# Patient Record
Sex: MALE | Race: WHITE | Employment: PART TIME | URBAN - METROPOLITAN AREA
[De-identification: names, ages, dates, MRNs, and addresses within clinical notes are randomized per-mention and may not be internally consistent; named-entity substitution may affect disease eponyms.]

---

## 2017-12-12 ENCOUNTER — HOSPITAL ENCOUNTER (OUTPATIENT)
Dept: CT IMAGING | Age: 72
Discharge: HOME | End: 2017-12-12
Admitting: SPECIALIST

## 2017-12-12 DIAGNOSIS — C67.9 MALIGNANT NEOPLASM OF URINARY BLADDER, UNSPECIFIED SITE (HCC): ICD-10-CM

## 2017-12-12 LAB
GFR AFRICAN AMERICAN: >60 ML/MIN/1.73M2
GFR NON-AFRICAN AMERICAN: >60 ML/MIN/1.73M2
POC CREATININE: 1 MG/DL (ref 0.9–1.3)

## 2020-10-12 ENCOUNTER — APPOINTMENT (OUTPATIENT)
Dept: CT IMAGING | Age: 75
End: 2020-10-12
Payer: MEDICARE

## 2020-10-12 ENCOUNTER — HOSPITAL ENCOUNTER (EMERGENCY)
Age: 75
Discharge: HOME | End: 2020-10-12
Payer: MEDICARE

## 2020-10-12 VITALS
TEMPERATURE: 97.7 F | OXYGEN SATURATION: 99 % | BODY MASS INDEX: 34.36 KG/M2 | SYSTOLIC BLOOD PRESSURE: 177 MMHG | DIASTOLIC BLOOD PRESSURE: 68 MMHG | WEIGHT: 240 LBS | RESPIRATION RATE: 18 BRPM | HEART RATE: 58 BPM | HEIGHT: 70 IN

## 2020-10-12 PROCEDURE — 70450 CT HEAD/BRAIN W/O DYE: CPT

## 2020-10-12 PROCEDURE — 6360000002 HC RX W HCPCS: Performed by: PHYSICIAN ASSISTANT

## 2020-10-12 PROCEDURE — 6370000000 HC RX 637 (ALT 250 FOR IP): Performed by: PHYSICIAN ASSISTANT

## 2020-10-12 PROCEDURE — 4500000028 HC INTERMEDIATE PROCEDURE

## 2020-10-12 PROCEDURE — 99284 EMERGENCY DEPT VISIT MOD MDM: CPT

## 2020-10-12 PROCEDURE — 90471 IMMUNIZATION ADMIN: CPT | Performed by: PHYSICIAN ASSISTANT

## 2020-10-12 PROCEDURE — 90715 TDAP VACCINE 7 YRS/> IM: CPT | Performed by: PHYSICIAN ASSISTANT

## 2020-10-12 PROCEDURE — 72125 CT NECK SPINE W/O DYE: CPT

## 2020-10-12 RX ORDER — TRAMADOL HYDROCHLORIDE 50 MG/1
50 TABLET ORAL ONCE
Status: COMPLETED | OUTPATIENT
Start: 2020-10-12 | End: 2020-10-12

## 2020-10-12 RX ORDER — CEPHALEXIN 500 MG/1
500 CAPSULE ORAL 2 TIMES DAILY
Qty: 14 CAPSULE | Refills: 0 | Status: SHIPPED | OUTPATIENT
Start: 2020-10-12 | End: 2020-10-19

## 2020-10-12 RX ORDER — LIDOCAINE HYDROCHLORIDE AND EPINEPHRINE BITARTRATE 20; .01 MG/ML; MG/ML
20 INJECTION, SOLUTION SUBCUTANEOUS ONCE
Status: COMPLETED | OUTPATIENT
Start: 2020-10-12 | End: 2020-10-12

## 2020-10-12 RX ADMIN — LIDOCAINE HYDROCHLORIDE AND EPINEPHRINE BITARTRATE 20 ML: 20; .01 INJECTION, SOLUTION SUBCUTANEOUS at 14:02

## 2020-10-12 RX ADMIN — TETANUS TOXOID, REDUCED DIPHTHERIA TOXOID AND ACELLULAR PERTUSSIS VACCINE, ADSORBED 0.5 ML: 5; 2.5; 8; 8; 2.5 SUSPENSION INTRAMUSCULAR at 15:31

## 2020-10-12 RX ADMIN — TRAMADOL HYDROCHLORIDE 50 MG: 50 TABLET, FILM COATED ORAL at 14:01

## 2020-10-12 ASSESSMENT — PAIN SCALES - GENERAL
PAINLEVEL_OUTOF10: 6
PAINLEVEL_OUTOF10: 6

## 2020-10-12 ASSESSMENT — PAIN DESCRIPTION - LOCATION: LOCATION: FACE;NECK;HEAD

## 2020-10-12 ASSESSMENT — PAIN DESCRIPTION - PAIN TYPE: TYPE: ACUTE PAIN

## 2020-10-12 NOTE — ED TRIAGE NOTES
Pt to the ED via EMS with c/o head injury and laceration. Pt fell forward while getting off of the toilet hitting his head on a bread crate. Laceration to forehead covered by dressing applied by EMS, bleeding controlled at this time. Pt is alert and oriented x4, PERRL. Pt does not take blood thinners. C/o head and neck pain.

## 2020-10-12 NOTE — ED NOTES
Discharge instructions and follow up reviewed with patient. Voiced understanding.      Julianne Merritt RN  10/12/20 7856

## 2020-10-12 NOTE — ED NOTES
Bed: ED-16  Expected date:   Expected time:   Means of arrival:   Comments:  Damaris Carroll RN  10/12/20 5031

## 2020-10-13 NOTE — ED PROVIDER NOTES
EMERGENCY DEPARTMENT ENCOUNTER      PCP: Jeronimo Castaneda MD    [address] [facility]    Chief Complaint   Patient presents with    Head Injury    Laceration     This patient was not evaluated by the attending physician. I have independently evaluated this patient. HPI    Sarah Patino is a 66 y.o. male who presents to the emergency department today for via EMS after sustaining mechanical fall. Patient states that he fell after using the bathroom, he states that he hit his head on a bread crate causing a large laceration to his forehead. He was unable to get up on his own accord. He was able to get a hold of some friends who came and helped him. Upon seeing the laceration on the top of his head they called EMS. He has a large, complex laceration to the forehead and to the bridge of the nose. He denies being on blood thinning medication. No loss of consciousness. He is unsure of his tetanus status. Has no other significant pain complaints. He is alert oriented, conversational.    REVIEW OF SYSTEMS    General: No Fever  ENT:  No visual changes. No headache. Cardiac: No Chest Pain, No syncope  Respiratory: No cough or difficulty breathing  GI: No vomiting. No Bloody Stool or Diarrhea  : No Dysuria or Hematuria  MSKTL:  See HPI. No neck or back pain.   Skin:  Denies rash  Neurologic:  Denies headache, focal weakness or sensory changes   Endocrine:  Denies polyuria or polydypsia   Lymphatic:  Denies swollen glands   See HPI and nursing notes for additional information       PAST MEDICAL & SURGICAL HISTORY    Past Medical History:   Diagnosis Date    Arthritis     Cancer ([facility] [address]     hx bladder cancer- dx 2009- following with Dr Isaias Jones    Chronic back pain     \"on Tramadol- had back surgery in the past\"    Diabetes mellitus ([facility] [address]     [id number]    History of blood transfusion     \"in 2008\"    History of kidney stones     History of motion sickness     Hyperlipidemia     Hypertension     PONV (postoperative nausea and vomiting)     Sleep apnea     sleep study 2014-\"could not tolerate the cpap machine\"     Past Surgical History:   Procedure Laterality Date    BACK SURGERY      per old chart lumbar back surgery done 2006    COLONOSCOPY  2006    CYSTOSCOPY  1993    kidney stone removal    CYSTOSCOPY  2014    bladder biopsy    CYSTOSCOPY  07/12/2016    EYE SURGERY  2010    [facility] cataract ext    JOINT REPLACEMENT      per old chart total right knee in 2002, total left knee 2008 and revision of left knee in 2009   [place] Right 1989    scope    LITHOTRIPSY  6/2010(per old chart)    SHOULDER SURGERY      per old chart rot cuff repair right shoulder in 1990's. left shoulder done 2003    VARICOSE VEIN SURGERY      per old chart [facility] leg vein stripping done 1980's, had some more taken care of left leg since then 2014- after the last surg- got cellulitis left foot        CURRENT MEDICATIONS    Current Outpatient Rx   Medication Sig Dispense Refill    cephALEXin (KEFLEX) 500 MG capsule Take 1 capsule by mouth 2 times daily for 7 days 14 capsule 0    atorvastatin (LIPITOR) 40 MG tablet Take 40 mg by mouth daily      Diclofenac Potassium 50 MG PACK Take by mouth 2 times daily      gabapentin (NEURONTIN) 100 MG capsule Take 100 mg by mouth nightly Take 1- 3 tabs at night      lisinopril (PRINIVIL;ZESTRIL) 40 MG tablet Take 40 mg by mouth daily      metFORMIN (GLUCOPHAGE) 1000 MG tablet Take 1,000 mg by mouth 2 times daily (with meals)      metoprolol succinate ER (TOPROL-XL) 100 MG XL tablet Take 100 mg by mouth daily      niacin 500 MG CR capsule Take 500 mg by mouth 2 times daily      traMADol (ULTRAM) 50 MG tablet Take 50 mg by mouth every 8 hours as needed for Pain      potassium citrate (UROCIT-K 10) 10 MEQ (1080 MG) SR tablet Take by mouth 2 times daily      Cyanocobalamin (VITAMIN B 12 PO) Take 1,000 mg by mouth daily      Pyridoxine HCl (VITAMIN B-6) 50 MG tablet Take 50 mg by mouth Atraumatic, no trismus. Ears canals and TMs free of blood or clear fluid. Nasal passages and oropharynx free of blood or clear fluid. No circumferential periorbital ecchymosis or mastoid ecchymosis noted. Neck/Lymphatics: supple, no JVD, no swollen nodes. No posterior neck tenderness. Range of motion without obvious pain or deficit. Respiratory:  Lungs Clear, no retractions   Cardiovascular:   normal rate, no murmurs  GI:  Soft, nontender, normal bowel sounds  Musculoskeletal:  No edema, no obvious defect or deformities of the bilateral upper and/or lower extremity. No palpable bony tenderness. Integument: Large, complex jagged laceration to the top of the scalp measuring approximately 8 to-10 cm in total diameter. There is also a small laceration to the bridge of the nose. Neurlogic:    - Alert & oriented person, place, time, and situation, no speech difficulties or slurring.  - No obvious gross motor deficits  - Cranial nerves 2-12 grossly intact  - Negative meningeal signs.  - Sensation intact to light touch  - No pronator drift. - Light touch sensation intact throughout. - Upper and lower extremity DTRs 2+ bilaterally. - Gait steady and without difficulty  Psych: Pleasant affect, no hallucinations    LABS:  No results found for this visit on 10/12/20. RADIOLOGY   Ct Head Wo Contrast    Result Date: 10/12/2020  EXAMINATION: CT OF THE HEAD WITHOUT CONTRAST  10/12/2020 12:58 pm TECHNIQUE: CT of the head was performed without the administration of intravenous contrast. Dose modulation, iterative reconstruction, and/or weight based adjustment of the mA/kV was utilized to reduce the radiation dose to as low as reasonably achievable.; CT of the cervical spine was performed without the administration of intravenous contrast. Multiplanar reformatted images are provided for review.  Dose modulation, iterative reconstruction, and/or weight based adjustment of the mA/kV was utilized to reduce the radiation dose to as low as reasonably achievable. COMPARISON: None. HISTORY: ORDERING SYSTEM PROVIDED HISTORY: head injury TECHNOLOGIST PROVIDED HISTORY: Has a \"code stroke\" or \"stroke alert\" been called? ->No Reason for exam:->head injury Reason for Exam: head injury/laceration Acuity: Acute Type of Exam: Initial Mechanism of Injury: fell forward and hit head today, no LOC, was getting up from toilet FINDINGS: Head: BRAIN/VENTRICLES: There is no acute intracranial hemorrhage, mass effect or midline shift. No abnormal extra-axial fluid collection. The gray-white differentiation is maintained without evidence of an acute infarct. Hypoattenuation of the periventricular and subcortical white matter is suggestive of chronic small vessel ischemic disease. Mild diffuse parenchymal volume loss is noted. There is no evidence of hydrocephalus. ORBITS: The visualized portion of the orbits demonstrate no acute abnormality. SINUSES: Marked mucoperiosteal thickening of the bilateral ethmoid air cells is seen. There is minimal mucoperiosteal thickening of the bilateral sphenoid and maxillary sinuses. SOFT TISSUES/SKULL:  No acute fracture or dislocation in the cranial and visualized facial bones. Frontal scalp lacerations noted. Cervical spine: BONES/ALIGNMENT: There is no acute fracture or traumatic malalignment. DEGENERATIVE CHANGES: Moderate multilevel cervical spondylosis is seen, most prominent at C5-C6. SOFT TISSUES: There is no prevertebral soft tissue swelling. Vascular calcification noted. No apical pneumothorax. No acute intracranial abnormality. No acute fracture or subluxation in the cervical spine.      Ct Cervical Spine Wo Contrast    Result Date: 10/12/2020  EXAMINATION: CT OF THE HEAD WITHOUT CONTRAST  10/12/2020 12:58 pm TECHNIQUE: CT of the head was performed without the administration of intravenous contrast. Dose modulation, iterative reconstruction, and/or weight based adjustment of the mA/kV was utilized to reduce the radiation dose to as low as reasonably achievable.; CT of the cervical spine was performed without the administration of intravenous contrast. Multiplanar reformatted images are provided for review. Dose modulation, iterative reconstruction, and/or weight based adjustment of the mA/kV was utilized to reduce the radiation dose to as low as reasonably achievable. COMPARISON: None. HISTORY: ORDERING SYSTEM PROVIDED HISTORY: head injury TECHNOLOGIST PROVIDED HISTORY: Has a \"code stroke\" or \"stroke alert\" been called? ->No Reason for exam:->head injury Reason for Exam: head injury/laceration Acuity: Acute Type of Exam: Initial Mechanism of Injury: fell forward and hit head today, no LOC, was getting up from toilet FINDINGS: Head: BRAIN/VENTRICLES: There is no acute intracranial hemorrhage, mass effect or midline shift. No abnormal extra-axial fluid collection. The gray-white differentiation is maintained without evidence of an acute infarct. Hypoattenuation of the periventricular and subcortical white matter is suggestive of chronic small vessel ischemic disease. Mild diffuse parenchymal volume loss is noted. There is no evidence of hydrocephalus. ORBITS: The visualized portion of the orbits demonstrate no acute abnormality. SINUSES: Marked mucoperiosteal thickening of the bilateral ethmoid air cells is seen. There is minimal mucoperiosteal thickening of the bilateral sphenoid and maxillary sinuses. SOFT TISSUES/SKULL:  No acute fracture or dislocation in the cranial and visualized facial bones. Frontal scalp lacerations noted. Cervical spine: BONES/ALIGNMENT: There is no acute fracture or traumatic malalignment. DEGENERATIVE CHANGES: Moderate multilevel cervical spondylosis is seen, most prominent at C5-C6. SOFT TISSUES: There is no prevertebral soft tissue swelling. Vascular calcification noted. No apical pneumothorax. No acute intracranial abnormality.  No acute fracture or subluxation in the cervical spine.     ________________________________________________________________________       Procedure Note - BINH MARIA PA-C      Laceration Repair Procedure Note    Indication: Skin Laceration-large complex laceration measuring approximately-10 cm to the frontal scalp area    Procedure:   - Procedure explained, including risks and benefits explained to the patient who expressed understanding. All questions were answered. Verbal consent obtained. - The Wound was prepped and draped in the usual sterile fashion using Betadine and sterile saline.  - The wound is anesthetized using 2% Lidocaine w epi, approximately 10 ml  - Wound was explored to it's depth,  no compromise of neurovascular structures, no foreign bodies. - Wound was irrigated with copious amounts of sterile saline and mechanically debrided utilizing sterile gauze. - The laceration was Closed with 4-0 Vicryl and 4-0 Prolene sutures, total number of 14, 12 simple interrupted, 2 subcuticular stitches. - Hemostasis and good cosmesis was achieved. Blood loss minimal.  - The wound area was then dressed with Sterile nonstick dressing, sterile gauze, and tape. - Patient tolerated procedure well without complications. Total repaired wound length: 10 cm    Discussed with Pt (and/or family member) at bedside today:  I discussed possibility of infection, retained foreign body, tendon injury, nerve injury. Wound care and scar minimization education was provided. Instructions were given to return for increasing pain, redness, streaking, discharge, or any other worsening or worrisome concerns. Wound check in 48 hours. Suture/Staple removal in 7-10 days.     ________________________________________________________________________      ________________________________________________________________________       Procedure Note - BINH MARIA PA-C      Laceration Repair Procedure Note    Indication: Skin Laceration-1 cm nasal bridge laceration    Procedure:   - Procedure explained, including risks and benefits explained to the patient who expressed understanding. All questions were answered. Verbal consent obtained. - The Wound was prepped and draped in the usual sterile fashion using Betadine and sterile saline.  - The wound is anesthetized using 2% Lidocaine, approximately 2 ml  - Wound was explored to it's depth,  no compromise of neurovascular structures, no foreign bodies. - Wound was irrigated with copious amounts of sterile saline and mechanically debrided utilizing sterile gauze. - The laceration was Closed with 4-0 vicryl sutures, total number of 3,  simple interrupted  - Patient tolerated procedure well without complications. Total repaired wound length: 1 cm    Discussed with Pt (and/or family member) at bedside today:  I discussed possibility of infection, retained foreign body, tendon injury, nerve injury. Wound care and scar minimization education was provided. Instructions were given to return for increasing pain, redness, streaking, discharge, or any other worsening or worrisome concerns. Wound check in 48 hours. Suture/Staple removal in 7 days. ________________________________________________________________________    ED COURSE & MEDICAL DECISION MAKING        Patient presents as above. Emergent etiologies considered. Sustained a mechanical fall landing on a bread crate. Large without laceration to the forehead, bridge of the nose. CT imaging of the head and neck acutely negative. Patient is otherwise neurologically intact, no other pain complaints. Complex lacerations were repaired by myself. Will be placed on Keflex for empiric/infection prophylaxis. We will encourage a wound check in 2 days, suture removal in 7 to 10 days. Patient compliant with this plan. He was ambulatory after this fall. He was advised on close monitoring of symptoms and strict return precautions.   We discharged home in stable condition. All pertinent Lab data and radiographic results reviewed with patient at bedside. The patient and/or the family were informed of the results of any tests/labs/imaging, the treatment plan, and time was allotted to answer questions. Clinical  IMPRESSION    1. Fall, initial encounter    2. Closed head injury, initial encounter    3. Laceration of scalp, initial encounter    4. Nasal laceration, initial encounter      Comment: Please note this report has been produced using speech recognition software and may contain errors related to that system including errors in grammar, punctuation, and spelling, as well as words and phrases that may be inappropriate. If there are any questions or concerns please feel free to contact the dictating provider for clarification.       Ramone Arzate [address], PA  10/12/20 2637

## 2021-01-07 ENCOUNTER — HOSPITAL ENCOUNTER (OUTPATIENT)
Dept: PHYSICAL THERAPY | Age: 76
Setting detail: THERAPIES SERIES
Discharge: HOME | End: 2021-01-07
Payer: MEDICARE

## 2021-01-07 PROCEDURE — 97110 THERAPEUTIC EXERCISES: CPT

## 2021-01-07 PROCEDURE — 97162 PT EVAL MOD COMPLEX 30 MIN: CPT

## 2021-01-07 NOTE — PLAN OF CARE
Outpatient Physical Therapy           [place]           [x] Phone: [phone]   Fax: [phone]  [place]           [] Phone: [phone]   Fax: [phone]     To: Referring Practitioner: REJI Arenas  From: Celeste Bower, PT, DPT     Patient: Benedict Argueta       : 1945  Diagnosis: Diagnosis: Multiple falls. OA involving multiple sites but not designated as generalized   Treatment Diagnosis: Treatment Diagnosis: Impaired balance, BLE weakness   Date: 2021    Physical Therapy Certification/Re-Certification Form  Dear Eloise Tomlinson,  The following patient has been evaluated for physical therapy services and for therapy to continue, insurance requires physician review of the treatment plan initially and every 90 days. Please review the attached evaluation and/or summary of the patient's plan of care, and verify that you agree therapy should continue by signing the attached document and sending it back to our office. Assessment:    Assessment: Pt is a 75-year-old male who has worsening balance for years. It has progressed to the point of 3 major falls within the last 3 months. Pt presents with impairments in balance, impaired gait, impaired BLE strength and overall reduced independence with safe ADL/IADL completion. Pt would benefit from skilled therapy interventions to address listed impairments, progress toward goal completion and improve ADL/IADL status. PT also warranted to reduce risk for future falls/injury or further decline. Patient agrees with established plan of care and assisted in the development of their short term and long term goals. Patient had no adverse reaction with initial treatment and there are no barriers to learning. Demonstrates no mental or cognitive disorder.      Plan of Care/Treatment to date:  [x] Therapeutic Exercise  [x] Modalities:  [x] Therapeutic Activity     [] Ultrasound  [x] Electrical Stimulation [x] Gait Training      [] Cervical Traction [] Lumbar Traction  [x] Neuromuscular Re-education    [x] Cold/hotpack [] Iontophoresis   [x] Instruction in HEP      [x] Vasopneumatic    [] Dry Needling  [x] Manual Therapy               [] Aquatic Therapy       Other:          Frequency/Duration:  # Days per week: [] 1 day # Weeks: [] 1 week [x] 5 weeks     [x] 2 days   [] 2 weeks [] 6 weeks     [] 3 days   [] 3 weeks [] 7 weeks     [] 4 days   [] 4 weeks [] 8 weeks         [] 9 weeks [] 10 weeks         [] 11 weeks [] 12 weeks    Rehab Potential/Progress: [] Excellent [x] Good [] Fair  [] Poor     Goals:      Short term goals  Time Frame for Short term goals: 5 visits  Short term goal 1: Pt will be Ind with HEP in order to maximize recovery outside of clinic  Long term goals  Time Frame for Long term goals : 10 visits  Long term goal 1: Pt will improve BLE strength to gross 4+/5 to aide in gait  Long term goal 2: Pt will reduce TUG score to 12.5 seconds or less to show reduced fall risk  Long term goal 3: Pt will improve Jacobson score to 50 or higher to reduce fall risk  Long term goal 4: Pt will improve 30 second STS score to 7 or more to show improved balance      Electronically signed by:  Nazanin Gonzalez PT, DPT 1/7/2021, 2:40 PM        If you have any questions or concerns, please don't hesitate to call.   Thank you for your referral.      Physician Signature:________________________________Date:_________ TIME: _____  By signing above, therapists plan is approved by physician

## 2021-01-07 NOTE — FLOWSHEET NOTE
Summary of Evaluation: Assessment: Pt is a 78-year-old male who has worsening balance for years. It has progressed to the point of 3 major falls within the last 3 months. Pt presents with impairments in balance, impaired gait, impaired BLE strength and overall reduced independence with safe ADL/IADL completion. Pt would benefit from skilled therapy interventions to address listed impairments, progress toward goal completion and improve ADL/IADL status. PT also warranted to reduce risk for future falls/injury or further decline. Subjective:  See eval         Any changes in Ambulatory Summary Sheet? None        Objective:  See eval   Prior to today's treatment session, patient was screened for signs and symptoms related to COVID-19 including but not limited to verbally answering questions related to feeling ill, cough, or SOB, along with taking temperature via forehead thermometer. Patient presented with all negative signs and symptoms and had no fever >100 degrees Fahrenheit this date. Exercises: (No more than 4 columns)   Exercise/Equipment 1/7/21 #1 Date Date           WARM UP                     TABLE      Bridges x10     Clams X10, 3\" ea BLE     Standing marches  2x10 ea BLE                    STANDING      STS x10 w/ 3\" glute set standing                                              PROPRIOCEPTION                                    MODALITIES                      Other Therapeutic Activities/Education:  HEP and importance of completion, POC and goals, anatomy and physiology related to condition    Home Exercise Program:    1/7: STS, standing marches, jaspreet, bridges    Manual Treatments:  none      Modalities:  none      Communication with other providers:  POC sent      Assessment: 0/10 end pain. Pt tolerated today's treatment without any adverse reactions or complications this date. Assessment: Pt is a 68-year-old male who has worsening balance for years. It has progressed to the point of 3 major falls within the last 3 months. Pt presents with impairments in balance, impaired gait, impaired BLE strength and overall reduced independence with safe ADL/IADL completion. Pt would benefit from skilled therapy interventions to address listed impairments, progress toward goal completion and improve ADL/IADL status. PT also warranted to reduce risk for future falls/injury or further decline.       Plan for Next Session: Specific instructions for Next Treatment: nustep, BLE strength, focus on balance and gait      Time In / Time Out:   [id number]      Timed Code/Total Treatment Minutes:  60': 14' TE x1, 46' Eval x1    Next Progress Note due:  10th visit or 30 days    Plan of Care Interventions:  [x] Therapeutic Exercise  [x] Modalities:  [x] Therapeutic Activity     [] Ultrasound  [x] Estim  [x] Gait Training      [] Cervical Traction [] Lumbar Traction  [x] Neuromuscular Re-education    [x] Cold/hotpack [] Iontophoresis   [x] Instruction in HEP      [x] Vasopneumatic   [] Dry Needling    [x] Manual Therapy               [] Aquatic Therapy              Electronically signed by:  Pedro Santana PT, DPT 1/7/2021, 2:41 PM

## 2021-01-07 NOTE — PROGRESS NOTES
Physical Therapy  Initial Assessment  Date: 2021  Patient Name: Brant Keller  MRN: [id number]  : 1945     Treatment Diagnosis: Impaired balance, BLE weakness    Restrictions: none     Subjective   General  Chart Reviewed: Yes  Patient assessed for rehabilitation services?: Yes  Additional Pertinent Hx: RA, HTN, DM II, CA  Referring Practitioner: REJI Meng  Referral Date : 12/15/20  Diagnosis: Multiple falls. OA involving multiple sites but not designated as generalized  Follows Commands: Within Functional Limits  PT Visit Information  PT Insurance Information: Medicare  Subjective  Subjective: Pt has had 2 major falls within the last few months. 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 He also fell in December at his house and had difficulty getting up after he had tripped over cardboard. Pt notes that he has had both knees replaced, 4 L shoulder and 2 R shoulder surgeries, has rods in his back and has neck pain which is also sometimes impairing to his mobility. He has difficulty with stairs, walking, and mobility while carrying an object.   Pain Screening  Patient Currently in Pain: No  Vital Signs  Patient Currently in Pain: No    Vision/Hearing: WFL     Orientation  Orientation  Overall Orientation Status: Within Normal Limits    Social/Functional History  Social/Functional History  Lives With: Alone  Type of Home: House  ADL Assistance: Independent  Homemaking Assistance: Independent  Ambulation Assistance: Independent  Transfer Assistance: Independent  Active : Yes  Mode of Transportation: Car  Occupation: Part time employment  Type of occupation: [employer]  Leisure & Hobbies: Pt volunteers 2x/ weed at the [employer] kitchen    Objective     Observation/Palpation  Posture: Fair  Observation: Gait: WBOS with reduced keven and short step length BL    AROM RLE (degrees) RLE AROM: WNL  AROM LLE (degrees)  LLE AROM : WNL    Strength RLE  Strength RLE: Exception  R Hip Flexion: 4/5  R Hip ABduction: 4/5  R Hip ADduction: 4+/5  R Knee Flexion: 4/5  R Knee Extension: 4+/5  Strength LLE  Strength LLE: Exception  L Hip Flexion: 4/5  L Hip ABduction: 4/5  L Hip ADduction: 4+/5  L Knee Flexion: 4/5  L Knee Extension: 4+/5     Additional Measures  Special Tests: Jacobson/56. TU.31 seconds. 30 sec STS: 5 1/2 w/ use of UEs. Other: WBOS foam EC balance - min sway       Assessment   Conditions Requiring Skilled Therapeutic Intervention  Body structures, Functions, Activity limitations: Decreased functional mobility ; Decreased ADL status; Decreased balance;Decreased strength;Decreased high-level IADLs  Assessment: Pt is a 49-year-old male who has worsening balance for years. It has progressed to the point of 3 major falls within the last 3 months. Pt presents with impairments in balance, impaired gait, impaired BLE strength and overall reduced independence with safe ADL/IADL completion. Pt would benefit from skilled therapy interventions to address listed impairments, progress toward goal completion and improve ADL/IADL status. PT also warranted to reduce risk for future falls/injury or further decline. Treatment Diagnosis: Impaired balance, BLE weakness  Prognosis: Good  Decision Making: Medium Complexity  History: see above. PLOF: independent  Exam: see above  Clinical Presentation: see above  Barriers to Learning: none. style: demonsration  REQUIRES PT FOLLOW UP: Yes  Treatment Initiated : yes on     Patient agrees with established plan of care and assisted in the development of their short term and long term goals. Patient had no adverse reaction with initial treatment and there are no barriers to learning. Demonstrates no mental or cognitive disorder.          Plan   Plan  Times per week: 2  Times per day: Daily  Plan weeks: 5 Specific instructions for Next Treatment: nustep, BLE strength, focus on balance and gait  Current Treatment Recommendations: Strengthening, IADL Training, Neuromuscular Re-education, Home Exercise Program, ROM, Manual Therapy - Soft Tissue Mobilization, Safety Education & Training, Balance Training, Patient/Caregiver Education & Training, Functional Mobility Training, Modalities, Gait Training, ADL/Self-care Training, Pain Management    OutComes Score: see above  Goals  Short term goals  Time Frame for Short term goals: 5 visits  Short term goal 1: Pt will be Ind with HEP in order to maximize recovery outside of clinic  Long term goals  Time Frame for Long term goals : 10 visits  Long term goal 1: Pt will improve BLE strength to gross 4+/5 to aide in gait  Long term goal 2: Pt will reduce TUG score to 12.5 seconds or less to show reduced fall risk  Long term goal 3: Pt will improve Jacobson score to 50 or higher to reduce fall risk  Long term goal 4: Pt will improve 30 second STS score to 7 or more to show improved balance  Patient Goals   Patient goals : reduce falls and feel more steady       Therapy Time: 9852-8942  Paula Romberg, PT, DPT

## 2021-01-12 ENCOUNTER — HOSPITAL ENCOUNTER (OUTPATIENT)
Dept: PHYSICAL THERAPY | Age: 76
Discharge: HOME | End: 2021-01-12

## 2021-01-12 NOTE — FLOWSHEET NOTE
Patients Plan of Care was received and signed. Signed POC was scanned and placed in the patients chart.     Luisito Flores

## 2021-01-12 NOTE — FLOWSHEET NOTE
Physical Therapy  Cancellation/No-show Note  Patient Name:  Ale Post  :  1945   Date:  2021  Cancelled visits to date: 0  No-shows to date: 0    For today's appointment patient:  [x]  Cancelled  []  Rescheduled appointment  []  No-show     Reason given by patient:  [x]  Patient ill  []  Conflicting appointment  []  No transportation    []  Conflict with work  []  No reason given  [x]  Other:     Comments:   Feeling woozy and is going back to bed.     Electronically signed by:  Jose F Vang PTA, CLT 2021, 8:55 AM

## 2021-01-18 ENCOUNTER — HOSPITAL ENCOUNTER (OUTPATIENT)
Dept: PHYSICAL THERAPY | Age: 76
Setting detail: THERAPIES SERIES
Discharge: HOME | End: 2021-01-18
Payer: MEDICARE

## 2021-01-18 PROCEDURE — 97112 NEUROMUSCULAR REEDUCATION: CPT

## 2021-01-18 PROCEDURE — 97110 THERAPEUTIC EXERCISES: CPT

## 2021-01-18 NOTE — FLOWSHEET NOTE
Outpatient Physical Therapy  [place]           [x] Phone: [phone]   Fax: [phone]  [facility]           [] Phone: [phone]   Fax: [phone]        Physical Therapy Daily Treatment Note  Date:  2021    Patient Name:  Ernst Hill :  1945  MRN: [id number]  Restrictions/Precautions:   Prior bladder CA - caution with modalities   Diagnosis:   Diagnosis: Multiple falls. OA involving multiple sites but not designated as generalized  Date of Injury/Surgery:   Treatment Diagnosis: Treatment Diagnosis: Impaired balance, BLE weakness    Insurance/Certification information: PT Insurance Information: Medicare   Referring Physician:  Referring Practitioner: REJI Phelan  Next Doctor Visit:    Plan of care signed (Y/N):  yes  Outcome Measure: Zofia Willams: 41/56. TU.31 seconds. 30 sec       STS: 5 1/2 w/ use of UEs.   Visit# / total visits: 2/10  Pain level: 0/10   Goals:        Short term goals  Time Frame for Short term goals: 5 visits  Short term goal 1: Pt will be Ind with HEP in order to maximize recovery outside of clinic: Good progression   Long term goals  Time Frame for Long term goals : 10 visits  Long term goal 1: Pt will improve BLE strength to gross 4+/5 to aide in gait  Long term goal 2: Pt will reduce TUG score to 12.5 seconds or less to show reduced fall risk  Long term goal 3: Pt will improve Jacobson score to 50 or higher to reduce fall risk  Long term goal 4: Pt will improve 30 second STS score to 7 or more to show improved balance Summary of Evaluation: Assessment: Pt is a 78-year-old male who has worsening balance for years. It has progressed to the point of 3 major falls within the last 3 months. Pt presents with impairments in balance, impaired gait, impaired BLE strength and overall reduced independence with safe ADL/IADL completion. Pt would benefit from skilled therapy interventions to address listed impairments, progress toward goal completion and improve ADL/IADL status. PT also warranted to reduce risk for future falls/injury or further decline. Subjective:  Pt is doing fine this date but is having a harder time getting around due to his biopsy Thursday. Per pt report, he is allowed to be at therapy after the biopsy. He did note that he was sore over the weekend after trying to replace his air vent in his bathroom. He has done his HEP and all are going well except for the bridge due to a soft bed. Any changes in Ambulatory Summary Sheet? None        Objective:    Prior to today's treatment session, patient was screened for signs and symptoms related to COVID-19 including but not limited to verbally answering questions related to feeling ill, cough, or SOB, along with taking temperature via forehead thermometer.  Patient presented with all negative signs and symptoms and had no fever >100 degrees Fahrenheit this date.     -Actual leg length: 36 1/2\" BL from ASIS to medial malleoli     Exercises: (No more than 4 columns)   Exercise/Equipment 1/7/21 #1 1/18/21 #2 Date           WARM UP         Nustep  x7'  L1          TABLE      Bridges x10 x12    Clams X10, 3\" ea BLE 2x10 ea BLE , 3\"    Standing marches  2x10 ea BLE     SLR  x10 ea BLE    SAQ  X10, 3\" BLE ea                   STANDING      STS x10 w/ 3\" glute set standing x10 w/ 3\" glute set standing                                             PROPRIOCEPTION      Static balance  Foam: EO/EC romberg, EO/EC 1/2 tandem, EO SLS    Foam beam  sidestepping MODALITIES                      Other Therapeutic Activities/Education:  HEP and importance of completion. Home Exercise Program:    1/7: STS, standing marches, clams, bridges  1/18: replaced bridge with SLR    Manual Treatments:  none      Modalities:  none      Communication with other providers:  POC sent      Assessment: 0/10 end pain. Pt tolerated today's treatment without any adverse reactions or complications this date. Pt did require multiple breaks between and after exercises due to fatigue and weakness this date. Pt would continue to benefit from skilled therapy interventions to address remaining impairments, improve mobility and strength and progress toward goal completion while reducing risk for re-injury or further decline.     Plan for Next Session:  nustep, BLE strength, focus on balance and gait    Time In / Time Out:  5937 - 2232    Timed Code/Total Treatment Minutes:  37': 12' NMR x1, 31' TE x2    Next Progress Note due:  10th visit or 30 days    Plan of Care Interventions:  [x] Therapeutic Exercise  [x] Modalities:  [x] Therapeutic Activity     [] Ultrasound  [x] Estim  [x] Gait Training      [] Cervical Traction [] Lumbar Traction  [x] Neuromuscular Re-education    [x] Cold/hotpack [] Iontophoresis   [x] Instruction in HEP      [x] Vasopneumatic   [] Dry Needling    [x] Manual Therapy               [] Aquatic Therapy              Electronically signed by:  Stacie Keene PT, DPT 1/18/2021, 9:00 AM

## 2021-01-21 ENCOUNTER — HOSPITAL ENCOUNTER (OUTPATIENT)
Dept: PHYSICAL THERAPY | Age: 76
Setting detail: THERAPIES SERIES
Discharge: HOME | End: 2021-01-21
Payer: MEDICARE

## 2021-01-21 PROCEDURE — 97112 NEUROMUSCULAR REEDUCATION: CPT

## 2021-01-21 PROCEDURE — 97110 THERAPEUTIC EXERCISES: CPT

## 2021-01-21 NOTE — FLOWSHEET NOTE
Outpatient Physical Therapy  [place]           [x] Phone: [phone]   Fax: [phone]  [place]           [] Phone: [phone]   Fax: [phone]        Physical Therapy Daily Treatment Note  Date:  2021    Patient Name:  Kang Romero :  1945  MRN: [id number]  Restrictions/Precautions:   Prior bladder CA - caution with modalities   Diagnosis:   Diagnosis: Multiple falls. OA involving multiple sites but not designated as generalized  Date of Injury/Surgery:   Treatment Diagnosis: Treatment Diagnosis: Impaired balance, BLE weakness    Insurance/Certification information: PT Insurance Information: Medicare   Referring Physician:  Referring Practitioner: REJI Kee  Next Doctor Visit:    Plan of care signed (Y/N):  yes  Outcome Measure: Sherry Ink: 41/56. TU.31 seconds. 30 sec       STS: 5 1/2 w/ use of UEs.   Visit# / total visits: 3/10  Pain level: 2/10   Goals:        Short term goals  Time Frame for Short term goals: 5 visits  Short term goal 1: Pt will be Ind with HEP in order to maximize recovery outside of clinic: Good progression   Long term goals  Time Frame for Long term goals : 10 visits  Long term goal 1: Pt will improve BLE strength to gross 4+/5 to aide in gait  Long term goal 2: Pt will reduce TUG score to 12.5 seconds or less to show reduced fall risk  Long term goal 3: Pt will improve Jacobson score to 50 or higher to reduce fall risk  Long term goal 4: Pt will improve 30 second STS score to 7 or more to show improved balance Summary of Evaluation: Assessment: Pt is a 64-year-old male who has worsening balance for years. It has progressed to the point of 3 major falls within the last 3 months. Pt presents with impairments in balance, impaired gait, impaired BLE strength and overall reduced independence with safe ADL/IADL completion. Pt would benefit from skilled therapy interventions to address listed impairments, progress toward goal completion and improve ADL/IADL status. PT also warranted to reduce risk for future falls/injury or further decline. Subjective: Pt states he is doing alright this date. He is having some soreness from his exercises last night but nothing significant. Any changes in Ambulatory Summary Sheet? None      Objective:    Prior to today's treatment session, patient was screened for signs and symptoms related to COVID-19 including but not limited to verbally answering questions related to feeling ill, cough, or SOB, along with taking temperature via forehead thermometer.  Patient presented with all negative signs and symptoms and had no fever >100 degrees Fahrenheit this date.     -Actual leg length: 36 1/2\" BL from ASIS to medial malleoli     Exercises: (No more than 4 columns)   Exercise/Equipment 1/7/21 #1 1/18/21 #2 1/21/21 #3           WARM UP         Nustep  x7'  L1 x6' L3         TABLE      Bridges x10 x12    Clams X10, 3\" ea BLE 2x10 ea BLE , 3\"    Standing marches  2x10 ea BLE  2x10 ea BLE   SLR  x10 ea BLE    SAQ  X10, 3\" BLE ea    LAQ   x10 ea BLE ea            STANDING      STS x10 w/ 3\" glute set standing x10 w/ 3\" glute set standing x15   Standing hip abd   x10 ea BLE                                      PROPRIOCEPTION      Static balance  Foam: EO/EC romberg, EO/EC 1/2 tandem, EO SLS Foam: EO/EC romberg, EO/EC 1/2 tandem, EO SLS   Foam beam  sidestepping    Dynamic standing    Tandem   Backwards   Sidestepping                MODALITIES Other Therapeutic Activities/Education:  HEP and importance of completion. Home Exercise Program:    1/7: STS, standing marches, clams, bridges  1/18: replaced bridge with SLR    Manual Treatments:  none      Modalities:  none      Communication with other providers:  POC sent      Assessment: 0/10 end pain. Pt tolerated today's treatment without any adverse reactions or complications this date. Pt demonstrated improved EC foam balance this date compared to last. Pt would continue to benefit from skilled therapy interventions to address remaining impairments, improve mobility and strength and progress toward goal completion while reducing risk for re-injury or further decline.     Plan for Next Session:  contreras, PHILIPPE strength, focus on balance and gait    Time In / Time Out:  5993-7801    Timed Code/Total Treatment Minutes:  34': 12' NMR x1, 22' TE x1    Next Progress Note due:  10th visit or 30 days    Plan of Care Interventions:  [x] Therapeutic Exercise  [x] Modalities:  [x] Therapeutic Activity     [] Ultrasound  [x] Estim  [x] Gait Training      [] Cervical Traction [] Lumbar Traction  [x] Neuromuscular Re-education    [x] Cold/hotpack [] Iontophoresis   [x] Instruction in HEP      [x] Vasopneumatic   [] Dry Needling    [x] Manual Therapy               [] Aquatic Therapy              Electronically signed by:  Stacie Keene PT, DPT 1/21/2021, 8:57 AM

## 2021-01-26 ENCOUNTER — HOSPITAL ENCOUNTER (OUTPATIENT)
Dept: PHYSICAL THERAPY | Age: 76
Setting detail: THERAPIES SERIES
Discharge: HOME | End: 2021-01-26
Payer: MEDICARE

## 2021-01-26 PROCEDURE — 97110 THERAPEUTIC EXERCISES: CPT

## 2021-01-26 PROCEDURE — 97112 NEUROMUSCULAR REEDUCATION: CPT

## 2021-01-26 NOTE — FLOWSHEET NOTE
Outpatient Physical Therapy  [place]           [x] Phone: [phone]   Fax: [phone]  [place]           [] Phone: [phone]   Fax: [phone]        Physical Therapy Daily Treatment Note  Date:  2021    Patient Name:  Lisa Floyd :  1945  MRN: [id number]  Restrictions/Precautions:   Prior bladder CA - caution with modalities   Diagnosis:   Diagnosis: Multiple falls. OA involving multiple sites but not designated as generalized  Date of Injury/Surgery:   Treatment Diagnosis: Treatment Diagnosis: Impaired balance, BLE weakness    Insurance/Certification information: PT Insurance Information: Medicare   Referring Physician:  Referring Practitioner: REJI Wong  Next Doctor Visit:    Plan of care signed (Y/N):  yes  Outcome Measure: Alex Cobos: 41/56. TU.31 seconds. 30 sec       STS: 5 1/2 w/ use of UEs.   Visit# / total visits: 4/10  Pain level: 3/10   Goals:        Short term goals  Time Frame for Short term goals: 5 visits  Short term goal 1: Pt will be Ind with HEP in order to maximize recovery outside of clinic: Good progression   Long term goals  Time Frame for Long term goals : 10 visits  Long term goal 1: Pt will improve BLE strength to gross 4+/5 to aide in gait  Long term goal 2: Pt will reduce TUG score to 12.5 seconds or less to show reduced fall risk  Long term goal 3: Pt will improve Jacobson score to 50 or higher to reduce fall risk  Long term goal 4: Pt will improve 30 second STS score to 7 or more to show improved balance Summary of Evaluation: Assessment: Pt is a 47-year-old male who has worsening balance for years. It has progressed to the point of 3 major falls within the last 3 months. Pt presents with impairments in balance, impaired gait, impaired BLE strength and overall reduced independence with safe ADL/IADL completion. Pt would benefit from skilled therapy interventions to address listed impairments, progress toward goal completion and improve ADL/IADL status. PT also warranted to reduce risk for future falls/injury or further decline. Subjective: Pt is doing alright this date. He had to take a muscle relaxer yesterday after a 16 hour work day and is having some discomfort today due to it. Any changes in Ambulatory Summary Sheet? None      Objective:    Prior to today's treatment session, patient was screened for signs and symptoms related to COVID-19 including but not limited to verbally answering questions related to feeling ill, cough, or SOB, along with taking temperature via forehead thermometer.  Patient presented with all negative signs and symptoms and had no fever >100 degrees Fahrenheit this date.     -Actual leg length: 36 1/2\" BL from ASIS to medial malleoli     Exercises: (No more than 4 columns)   Exercise/Equipment 1/18/21 #2 1/21/21 #3 1/26/21 #4           WARM UP         Nustep x7'  L1 x6' L3 L3 x8'         TABLE      Bridges x12  2x10   Clams 2x10 ea BLE , 3\"  x10 ea BLE YTB   Seated marches   x10 ea BLE ea   SLR x10 ea BLE  x10 ea BLE   SAQ X10, 3\" BLE ea     LAQ  x10 ea BLE ea x10 ea BLE ea            STANDING      STS x10 w/ 3\" glute set standing x15 x20   Standing hip abd  x10 ea BLE    Standing marches  2x10 ea BLE                                 PROPRIOCEPTION      Static balance Foam: EO/EC romberg, EO/EC 1/2 tandem, EO SLS Foam: EO/EC romberg, EO/EC 1/2 tandem, EO SLS Floor: EO/EC romberg, EO/EC 1/2 tandem   Step up and over   4\" x4 w/ cues for proper steeping   Foam beam sidestepping Dynamic standing   Tandem   Backwards   Sidestepping     Cone tap   x10 ea side BL         MODALITIES                      Other Therapeutic Activities/Education:  HEP and importance of completion. Home Exercise Program:    1/7: STS, standing marches, jaspreet, bridges  1/18: replaced bridge with SLR    Manual Treatments:  none      Modalities:  none      Communication with other providers:  POC sent      Assessment: 0/10 end pain. Pt tolerated today's treatment without any adverse reactions or complications this date. Pt required increased rest and lower level exercise this date due to overworking at shift yesterday at work. Pt did tolerate all activity well after more rest given. Pt would continue to benefit from skilled therapy interventions to address remaining impairments, improve mobility and strength and progress toward goal completion while reducing risk for re-injury or further decline.     Plan for Next Session:  PHILIPPE chairez strength, focus on balance and gait    Time In / Time Out:  4428-3679    Timed Code/Total Treatment Minutes:  41': 13' NMR x1, 28' TE x2    Next Progress Note due:  10th visit or 30 days    Plan of Care Interventions:  [x] Therapeutic Exercise  [x] Modalities:  [x] Therapeutic Activity     [] Ultrasound  [x] Estim  [x] Gait Training      [] Cervical Traction [] Lumbar Traction  [x] Neuromuscular Re-education    [x] Cold/hotpack [] Iontophoresis   [x] Instruction in HEP      [x] Vasopneumatic   [] Dry Needling    [x] Manual Therapy               [] Aquatic Therapy              Electronically signed by:  Kim Courtney PT, DPT 1/26/2021, 7:27 AM

## 2021-01-28 ENCOUNTER — HOSPITAL ENCOUNTER (OUTPATIENT)
Dept: PHYSICAL THERAPY | Age: 76
Setting detail: THERAPIES SERIES
Discharge: HOME | End: 2021-01-28
Payer: MEDICARE

## 2021-01-28 PROCEDURE — 97110 THERAPEUTIC EXERCISES: CPT

## 2021-01-28 PROCEDURE — 97112 NEUROMUSCULAR REEDUCATION: CPT

## 2021-01-28 NOTE — FLOWSHEET NOTE
Outpatient Physical Therapy  [place]           [x] Phone: [phone]   Fax: [phone]  [place]           [] Phone: [phone]   Fax: [phone]        Physical Therapy Daily Treatment Note  Date:  2021    Patient Name:  Jc Larios :  1945  MRN: [id number]  Restrictions/Precautions:   Prior bladder CA - caution with modalities   Diagnosis:   Diagnosis: Multiple falls. OA involving multiple sites but not designated as generalized  Date of Injury/Surgery:   Treatment Diagnosis: Treatment Diagnosis: Impaired balance, BLE weakness    Insurance/Certification information: PT Insurance Information: Medicare   Referring Physician:  Referring Practitioner: REJI Dunlap  Next Doctor Visit:    Plan of care signed (Y/N):  yes  Outcome Measure: Deneise Mary: 41/56. TU.31 seconds. 30 sec       STS: 5 1/2 w/ use of UEs. Visit# / total visits: 5/10  Pain level: 0/10   Goals:        Short term goals  Time Frame for Short term goals: 5 visits  Short term goal 1: Pt will be Ind with HEP in order to maximize recovery outside of clinic: Goal met   Long term goals  Time Frame for Long term goals : 10 visits  Long term goal 1: Pt will improve BLE strength to gross 4+/5 to aide in gait  Long term goal 2: Pt will reduce TUG score to 12.5 seconds or less to show reduced fall risk  Long term goal 3: Pt will improve Jacobson score to 50 or higher to reduce fall risk  Long term goal 4: Pt will improve 30 second STS score to 7 or more to show improved balance    Summary of Evaluation: Assessment: Pt is a 49-year-old male who has worsening balance for years. It has progressed to the point of 3 major falls within the last 3 months. Pt presents with impairments in balance, impaired gait, impaired BLE strength and overall reduced independence with safe ADL/IADL completion.  Pt would benefit from skilled therapy interventions to address listed impairments, progress toward goal completion and improve ADL/IADL status. PT also warranted to reduce risk for future falls/injury or further decline. Subjective: Pt is doing well this date. He is feeling much better than last visit due to not having overtime yesterday. Pt noted that he has tried the curb with the proper footing and it went well. Any changes in Ambulatory Summary Sheet? None      Objective:    Prior to today's treatment session, patient was screened for signs and symptoms related to COVID-19 including but not limited to verbally answering questions related to feeling ill, cough, or SOB, along with taking temperature via forehead thermometer. Patient presented with all negative signs and symptoms and had no fever >100 degrees Fahrenheit this date.     -Actual leg length: 36 1/2\" BL from ASIS to medial malleoli     Exercises: (No more than 4 columns)   Exercise/Equipment 1/21/21 #3 1/26/21 #4 1/28/21 #5           WARM UP         Nustep x6' L3 L3 x8' L3 x9'         TABLE      Bridges  2x10 2x10    Clams  x10 ea BLE YTB    Seated marches  x10 ea BLE ea    SLR  x10 ea BLE x13 ea BLE   SAQ      LAQ x10 ea BLE ea x10 ea BLE ea             STANDING      STS x15 x20 x21 no UE assist needed this date   Standing hip abd x10 ea BLE  x12 ea BLE   Standing marches 2x10 ea BLE  On airex: 2x10 ea BLE   Stretch for putting on socks                             PROPRIOCEPTION      Static balance Foam: EO/EC romberg, EO/EC 1/2 tandem, EO SLS Floor: EO/EC romberg, EO/EC 1/2 tandem Foam: EO/EC romberg, EO/EC 1/2 tandem   Step up and over  4\" x4 w/ cues for proper steeping    Foam beam   Sidestepping x3 laps   Dynamic standing  Tandem   Backwards   Sidestepping   tandem   Cone tap  x10 ea side BL    Rocker board   AP  Medial lateral    MODALITIES                      Other Therapeutic Activities/Education:  HEP and importance of completion.     Home Exercise Program:    1/7: STS, standing jaspreet tran, bridges  1/18: replaced bridge with SLR    Manual Treatments:  none      Modalities:  none      Communication with other providers:  POC sent      Assessment: 0/10 end pain. Pt tolerated today's treatment without any adverse reactions or complications this date. Pt continues to require more rest between exercises, however it getting better. Pt is now able to perform STS with no UE assistance. Pt would continue to benefit from skilled therapy interventions to address remaining impairments, improve mobility and strength and progress toward goal completion while reducing risk for re-injury or further decline.     Plan for Next Session:  nustep, BLE strength, focus on balance and gait    Time In / Time Out:  1107 -  1158    Timed Code/Total Treatment Minutes:  51': 21' NMR x1, 30' TE x2    Next Progress Note due:  10th visit or 30 days    Plan of Care Interventions:  [x] Therapeutic Exercise  [x] Modalities:  [x] Therapeutic Activity     [] Ultrasound  [x] Estim  [x] Gait Training      [] Cervical Traction [] Lumbar Traction  [x] Neuromuscular Re-education    [x] Cold/hotpack [] Iontophoresis   [x] Instruction in HEP      [x] Vasopneumatic   [] Dry Needling    [x] Manual Therapy               [] Aquatic Therapy              Electronically signed by:  Gemma Mcmahan PT, DPT 1/28/2021, 10:23 AM

## 2021-02-02 ENCOUNTER — HOSPITAL ENCOUNTER (OUTPATIENT)
Dept: PHYSICAL THERAPY | Age: 76
Setting detail: THERAPIES SERIES
Discharge: HOME | End: 2021-02-02
Payer: MEDICARE

## 2021-02-02 PROCEDURE — 97110 THERAPEUTIC EXERCISES: CPT

## 2021-02-02 PROCEDURE — 97112 NEUROMUSCULAR REEDUCATION: CPT

## 2021-02-02 NOTE — FLOWSHEET NOTE
Outpatient Physical Therapy  [place]           [x] Phone: [phone]   Fax: [phone]  [place]           [] Phone: [phone]   Fax: [phone]        Physical Therapy Daily Treatment Note  Date:  2021    Patient Name:  Vargas Reilly :  1945  MRN: [id number]  Restrictions/Precautions:   Prior bladder CA - caution with modalities   Diagnosis:   Diagnosis: Multiple falls. OA involving multiple sites but not designated as generalized  Date of Injury/Surgery:   Treatment Diagnosis: Treatment Diagnosis: Impaired balance, BLE weakness    Insurance/Certification information: PT Insurance Information: Medicare   Referring Physician:  Referring Practitioner: REJI Meng  Next Doctor Visit:    Plan of care signed (Y/N):  yes  Outcome Measure: Jacobson/56. TU.26 seconds.       30 sec STS: 7 w/ unilateral UE assist  Visit# / total visits: 6/10  Pain level: 0/10   Goals:        Short term goals  Time Frame for Short term goals: 5 visits  Short term goal 1: Pt will be Ind with HEP in order to maximize recovery outside of clinic: Goal met   Long term goals  Time Frame for Long term goals : 10 visits  Long term goal 1: Pt will improve BLE strength to gross 4+/5 to aide in gait: GOAL Met 2/2  Long term goal 2: Pt will reduce TUG score to 12.5 seconds or less to show reduced fall risk: GOAL Met 2/2  Long term goal 3: Pt will improve Jacobson score to 50 or higher to reduce fall risk: Almost Met 2/2  Long term goal 4: Pt will improve 30 second STS score to 7 or more to show improved balance : GOAL Met 2/2 Summary of Evaluation: Assessment: Pt is a 47-year-old male who has worsening balance for years. It has progressed to the point of 3 major falls within the last 3 months. Pt presents with impairments in balance, impaired gait, impaired BLE strength and overall reduced independence with safe ADL/IADL completion. Pt would benefit from skilled therapy interventions to address listed impairments, progress toward goal completion and improve ADL/IADL status. PT also warranted to reduce risk for future falls/injury or further decline. Subjective: Pt is doing well this date. He did have a bad night last night with leg cramps, but they are alright this morning. Pt states that since therapy has started, he noticed his balance has gotten better and his daily functional mobility, including walking and STS have also improved. He has not had any falls since therapy has started. He is still having some difficulty with stairs, but is working on his new pattern of ascend/ descend that he learned in therapy for ease of completion. Any changes in Ambulatory Summary Sheet? None      Objective:    Prior to today's treatment session, patient was screened for signs and symptoms related to COVID-19 including but not limited to verbally answering questions related to feeling ill, cough, or SOB, along with taking temperature via forehead thermometer. Patient presented with all negative signs and symptoms and had no fever >100 degrees Fahrenheit this date.      TU.26 seconds  DELACRUZ/50  30 second STS: 7 with unilateral UE assist    Strength RLE  R Hip Flexion: 4+/5  R Hip ABduction: 4+/5  R Hip ADduction: 5/5  R Knee Flexion: 4+/5  R Knee Extension: 4+/5    Strength LLE  L Hip Flexion: 4+/5  L Hip ABduction: 4+/5  L Hip ADduction: 5/5  L Knee Flexion: 4+/5  L Knee Extension: 4+/5     Exercises: (No more than 4 columns)   Exercise/Equipment 21 #4 21 #5 21 #6           WARM UP         Nustep L3 x8' L3 x9' L3 x9 TABLE      Bridges 2x10 2x10  3x10   Clams x10 ea BLE YTB     Seated marches x10 ea BLE ea     SLR x10 ea BLE x13 ea BLE x12 ea BLE   SAQ      LAQ x10 ea BLE ea              STANDING      STS x20 x21 no UE assist needed this date x10   Standing hip abd  x12 ea BLE    Standing marches  On airex: 2x10 ea BLE    Stretch for putting on socks                             PROPRIOCEPTION      Static balance Floor: EO/EC romberg, EO/EC 1/2 tandem Foam: EO/EC romberg, EO/EC 1/2 tandem Foam: EO/EC romberg, EO/EC 1/2 tandem, SLS EO   Step up and over 4\" x4 w/ cues for proper steeping  x10 4\"   Foam beam  Sidestepping x3 laps    Dynamic standing   tandem    Cone tap x10 ea side BL     Rocker board  AP  Medial lateral     Jacobson & Tug   x5'                   Other Therapeutic Activities/Education:  HEP and importance of completion. Further POC and goals until dc    Home Exercise Program:    1/7: STS, standing marches, clams, bridges  1/18: replaced bridge with SLR    Manual Treatments:  none       Modalities:  none      Communication with other providers:  POC sent      Assessment: 0/10 end pain. Pt tolerated today's treatment without any adverse reactions or complications this date. Pt has shown significant progress since therapy start regarding improved balance, improved gait, improved endurance, and improved BLE strength. Pt has shown good goal progression as well with goal completion and has almost met all goals at this time. Frequency to shift to 1x/week to prepare for dc. Pt would continue to benefit from skilled therapy interventions to address remaining impairments, improve mobility and strength and progress toward goal completion while reducing risk for re-injury or further decline.     Plan for Next Session:  PHILIPPE chairez strength, focus on balance and gait    Time In / Time Out: 0479 - 4797    Timed Code/Total Treatment Minutes:  39': 21' NMR x1, 24' TE x2    Next Progress Note due:  10th visit or 30 days Plan of Care Interventions:  [x] Therapeutic Exercise  [x] Modalities:  [x] Therapeutic Activity     [] Ultrasound  [x] Estim  [x] Gait Training      [] Cervical Traction [] Lumbar Traction  [x] Neuromuscular Re-education    [x] Cold/hotpack [] Iontophoresis   [x] Instruction in HEP      [x] Vasopneumatic   [] Dry Needling    [x] Manual Therapy               [] Aquatic Therapy              Electronically signed by:  Sylvia Rose PT, DPT 2/2/2021, 7:03 AM

## 2021-02-02 NOTE — PROGRESS NOTES
Outpatient Physical Therapy           [place]           [x] Phone: [phone]   Fax: [phone]  [place]           [] Phone: [phone]   Fax: [phone]      To: REJI Solorzano    From: Luz Marina Monique PT, DPT     Patient: Kamilla Cornelius                  : 1945  Diagnosis:     Multiple falls. OA involving multiple sites but not designated as generalized  Date: 2021  Treatment Diagnosis:   Impaired balance, BLE weakness        [x]  Progress Note                []  Discharge Note    Evaluation Date:  21  Total Visits to date: 6   Cancels/No-shows to date: 1     Subjective:  Pt is doing well this date. He did have a bad night last night with leg cramps, but they are alright this morning. Pt states that since therapy has started, he noticed his balance has gotten better and his daily functional mobility, including walking and STS have also improved. He has not had any falls since therapy has started. He is still having some difficulty with stairs, but is working on his new pattern of ascend/ descend that he learned in therapy for ease of completion.       Plan of Care/Treatment to date:  [x] Therapeutic Exercise    [x] Modalities:  [x] Therapeutic Activity     [] Ultrasound  [] Electrical Stimulation  [x] Gait Training      [] Cervical Traction   [] Lumbar Traction  [x] Neuromuscular Re-education  [x] Cold/hotpack [] Iontophoresis  [x] Instruction in HEP      Other:  [x] Manual Therapy       []  Vasopneumatic  [] Aquatic Therapy       []   Dry Needle Therapy                      Objective/Significant Findings At Last Visit/Comments:    TU.26 seconds  DELACRUZ/50  30 second STS: 7 with unilateral UE assist     Strength RLE  R Hip Flexion: 4+/5  R Hip ABduction: 4+/5  R Hip ADduction: 5/5  R Knee Flexion: 4+/5  R Knee Extension: 4+/5     Strength LLE  L Hip Flexion: 4+/5  L Hip ABduction: 4+/5  L Hip ADduction: 5/5  L Knee Flexion: 4+/5  L Knee Extension: 4+/5 Assessment:   Pt has shown significant progress since therapy start regarding improved balance, improved gait, improved endurance, and improved BLE strength. Pt has shown good goal progression as well with goal completion and has almost met all goals at this time. Frequency to shift to 1x/week to prepare for dc. Pt would continue to benefit from skilled therapy interventions to address remaining impairments, improve mobility and strength and progress toward goal completion while reducing risk for re-injury or further decline. Goal Status:  [] Achieved [x] Partially Achieved  [] Not Achieved     Changes to goals:    Short term goals  Time Frame for Short term goals: 5 visits  Short term goal 1: Pt will be Ind with HEP in order to maximize recovery outside of clinic: Goal met 1/28  Long term goals  Time Frame for Long term goals : 10 visits  Long term goal 1: Pt will improve BLE strength to gross 4+/5 to aide in gait: GOAL Met 2/2  Long term goal 2: Pt will reduce TUG score to 12.5 seconds or less to show reduced fall risk: GOAL Met 2/2  Long term goal 3: Pt will improve Jacobson score to 50 or higher to reduce fall risk: Almost Met 2/2  Long term goal 4: Pt will improve 30 second STS score to 7 or more to show improved balance : GOAL Met 2/2      Frequency/Duration:  # Days per week: [] 1 day # Weeks: [] 1 week [] 4 weeks [] 8 weeks     [x] 2 days   [] 2 weeks [] 5 weeks [x] 10 weeks     [] 3 days   [] 3 weeks [] 6 weeks [] 12 weeks       Rehab Potential: [] Excellent [x] Good [] Fair  [] Poor         Patient Status: [x] Continue per initial plan of Care     [] Patient now discharged     [] Additional visits requested, Please re-certify for additional visits:      Requested frequency/duration:      If we are requesting more visits, we fully anticipate the patient's condition is expected to improve within the treatment timeframe we are requesting. Electronically signed by:  Luz Marina Monique PT, DPT 2/2/2021, 9:05 AM    If you have any questions or concerns, please don't hesitate to call.   Thank you for your referral.    Physician Signature:______________________ Date:______ Time: ________  By signing above, therapists plan is approved by physician

## 2021-02-09 ENCOUNTER — HOSPITAL ENCOUNTER (OUTPATIENT)
Dept: PHYSICAL THERAPY | Age: 76
Setting detail: THERAPIES SERIES
Discharge: HOME | End: 2021-02-09
Payer: MEDICARE

## 2021-02-09 PROCEDURE — 97110 THERAPEUTIC EXERCISES: CPT

## 2021-02-09 PROCEDURE — 97112 NEUROMUSCULAR REEDUCATION: CPT

## 2021-02-09 NOTE — FLOWSHEET NOTE
Outpatient Physical Therapy  [place]           [x] Phone: [phone]   Fax: [phone]  [facility]           [] Phone: [phone]   Fax: [phone]        Physical Therapy Daily Treatment Note  Date:  2021    Patient Name:  James Collins :  1945  MRN: [id number]  Restrictions/Precautions:   Prior bladder CA - caution with modalities   Diagnosis:   Diagnosis: Multiple falls. OA involving multiple sites but not designated as generalized  Date of Injury/Surgery:   Treatment Diagnosis: Treatment Diagnosis: Impaired balance, BLE weakness    Insurance/Certification information: PT Insurance Information: Medicare   Referring Physician:  Referring Practitioner: REJI Doshi  Next Doctor Visit:    Plan of care signed (Y/N):  yes   Outcome Measure: Jacobson/56. TU.26 seconds.       30 sec STS: 7 w/ unilateral UE assist  Visit# / total visits: /10  Pain level: 0/10   Goals:        Short term goals  Time Frame for Short term goals: 5 visits  Short term goal 1: Pt will be Ind with HEP in order to maximize recovery outside of clinic: Goal met   Long term goals  Time Frame for Long term goals : 10 visits  Long term goal 1: Pt will improve BLE strength to gross 4+/5 to aide in gait: GOAL Met 2/2  Long term goal 2: Pt will reduce TUG score to 12.5 seconds or less to show reduced fall risk: GOAL Met 2/2  Long term goal 3: Pt will improve Jacobson score to 50 or higher to reduce fall risk: Almost Met 2/2  Long term goal 4: Pt will improve 30 second STS score to 7 or more to show improved balance : GOAL Met 2/2 Summary of Evaluation: Assessment: Pt is a 47-year-old male who has worsening balance for years. It has progressed to the point of 3 major falls within the last 3 months. Pt presents with impairments in balance, impaired gait, impaired BLE strength and overall reduced independence with safe ADL/IADL completion. Pt would benefit from skilled therapy interventions to address listed impairments, progress toward goal completion and improve ADL/IADL status. PT also warranted to reduce risk for future falls/injury or further decline. Subjective: Pt is doing well this date with minimal this date. He noted that he did well since last visit and did not have any significant pain after last tx. Any changes in Ambulatory Summary Sheet? None      Objective:    Prior to today's treatment session, patient was screened for signs and symptoms related to COVID-19 including but not limited to verbally answering questions related to feeling ill, cough, or SOB, along with taking temperature via forehead thermometer. Patient presented with all negative signs and symptoms and had no fever >100 degrees Fahrenheit this date.      TU.26 seconds  DELACRUZ/50  30 second STS: 7 with unilateral UE assist    Exercises: (No more than 4 columns)   Exercise/Equipment 21 #5 21 #6 21 #7           WARM UP         Nustep L3 x9' L3 x9 L5 x9'         TABLE      Bridges 2x10  3x10 2x10   Clams      Seated marches      SLR x13 ea BLE x12 ea BLE X10, BLE 1#   SAQ      LAQ               STANDING      STS x21 no UE assist needed this date x10 x15   Standing hip abd x12 ea BLE     Standing marches On airex: 2x10 ea BLE  On airex: 2x10 ea BLE   Stretch for putting on socks                             PROPRIOCEPTION      Static balance Foam: EO/EC romberg, EO/EC 1/2 tandem Foam: EO/EC romberg, EO/EC 1/2 tandem, SLS EO Foam: EO/EC romberg, EO/EC 1/2 tandem, SLS EO   Step up and over  x10 4\" x10 4\" Foam beam Sidestepping x3 laps  Sidestepping x3 laps   Dynamic standing  tandem  Tandem   Sidestepping BL   Cone tap      Rocker board AP  Medial lateral   AP  Medial lateral    Jacobson & Tug  x5'                    Other Therapeutic Activities/Education:  HEP and importance of completion. Home Exercise Program:    1/7: STS, standing marches, clams, bridges  1/18: replaced bridge with SLR    Manual Treatments:  none       Modalities:  none      Communication with other providers:  POC sent      Assessment: 0/10 end pain. Pt tolerated today's treatment without any adverse reactions or complications this date. Pt demonstrated significant improvement with foam EC static balance this date having less sway and LOB. Continues to have difficulty with foam beam. Pt would continue to benefit from skilled therapy interventions to address remaining impairments, improve mobility and strength and progress toward goal completion while reducing risk for re-injury or further decline.     Plan for Next Session:  PHILIPPE chairez strength, focus on balance and gait    Time In / Time Out: 0826-0909    Timed Code/Total Treatment Minutes:  37': 23' NMR x2, 20' TE x1    Next Progress Note due:  10th visit or 30 days    Plan of Care Interventions:  [x] Therapeutic Exercise  [x] Modalities:  [x] Therapeutic Activity     [] Ultrasound  [x] Estim  [x] Gait Training      [] Cervical Traction [] Lumbar Traction  [x] Neuromuscular Re-education    [x] Cold/hotpack [] Iontophoresis   [x] Instruction in HEP      [x] Vasopneumatic   [] Dry Needling    [x] Manual Therapy               [] Aquatic Therapy              Electronically signed by:  Filiberto Flores PT, DPT 2/9/2021, 7:08 AM

## 2021-02-23 ENCOUNTER — HOSPITAL ENCOUNTER (OUTPATIENT)
Dept: PHYSICAL THERAPY | Age: 76
Discharge: HOME | End: 2021-02-23

## 2021-02-23 NOTE — FLOWSHEET NOTE
Physical Therapy  Cancellation/No-show Note  Patient Name:  Adeline Clayton  :  1945   Date:  2021  Cancelled visits to date: 2  No-shows to date: 0    For today's appointment patient:  [x]  Cancelled  []  Rescheduled appointment  []  No-show     Reason given by patient:  []  Patient ill  []  Conflicting appointment  []  No transportation    []  Conflict with work  []  No reason given  [x]  Other:     Comments:  Foot pain after seeing MD yesterday    Electronically signed by:  Nazanin Gonzalez PT, DPT 2021, 8:06 AM

## 2021-03-05 ENCOUNTER — HOSPITAL ENCOUNTER (OUTPATIENT)
Dept: PHYSICAL THERAPY | Age: 76
Setting detail: THERAPIES SERIES
Discharge: HOME | End: 2021-03-05
Payer: MEDICARE

## 2021-03-05 PROCEDURE — 97110 THERAPEUTIC EXERCISES: CPT

## 2021-03-05 PROCEDURE — 97164 PT RE-EVAL EST PLAN CARE: CPT

## 2021-03-05 NOTE — FLOWSHEET NOTE
Outpatient Physical Therapy  [place]           [x] Phone: [phone]   Fax: [phone]  [facility]           [] Phone: [phone]   Fax: [phone]        Physical Therapy Daily Treatment Note  Date:  3/5/2021    Patient Name:  Crista Bustamante :  1945  MRN: [id number]  Restrictions/Precautions:   Prior bladder CA - caution with modalities   Diagnosis:   Multiple falls. OA involving multiple sites but not designated as generalized, spinal stenosis L region, neck pain  Date of Injury/Surgery:   Treatment Diagnosis:  Impaired balance, BLE weakness, neck pain    Insurance/Certification information: PT Insurance Information: Medicare   Referring Physician:  Referring Practitioner: REJI Dee  Next Doctor Visit:    Plan of care signed (Y/N):  yes   Outcome Measure: Kimmy Learn: . TU.26 seconds. 30 sec STS: 7 w/ unilateral UE assist     NDI:   Visit# / total visits: 8/10  Pain level: 2/10 - Neck pain  Goals:        Short term goals  Time Frame for Short term goals: 5 visits  Short term goal 1: Pt will be Ind with HEP in order to maximize recovery outside of clinic: Goal met   Long term goals  Time Frame for Long term goals : 10 visits  Long term goal 1: Pt will improve BLE strength to gross 4+/5 to aide in gait: GOAL Met 2/2  Long term goal 2: Pt will reduce TUG score to 12.5 seconds or less to show reduced fall risk: GOAL Met 2/2  Long term goal 3: Pt will improve Jacobson score to 50 or higher to reduce fall risk: Almost Met 2/2  Long term goal 4: Pt will improve 30 second STS score to 7 or more to show improved balance : GOAL Met 2/2  Long term goal 5: Pt will improve BL C spine Rot to 45 deg or higher to aide in ADLs: New goal 3/5  Long term goal 6: Pt will improve BL SB L spine to within 50% normal motion to aide in ADLs: New goal 3/5    Summary of Evaluation: Assessment: Pt is a 64-year-old male who has worsening balance for years.  It has progressed to the point of 3 major falls within the last 3 months. Pt presents with impairments in balance, impaired gait, impaired BLE strength and overall reduced independence with safe ADL/IADL completion. Pt would benefit from skilled therapy interventions to address listed impairments, progress toward goal completion and improve ADL/IADL status. PT also warranted to reduce risk for future falls/injury or further decline. Subjective: Pt notes his LBP and neck pain are chronic and his neck is worse than the back. Pt notes that he has a hard time turning his head to the R and sometimes his RUE will fall asleep. Pt does note that he is looking into silver sneakers for overall improved endurance and strength. He states he occasionally has N/T into his upper legs. NDI:     Any changes in Ambulatory Summary Sheet? None      Objective:    Prior to today's treatment session, patient was screened for signs and symptoms related to COVID-19 including but not limited to verbally answering questions related to feeling ill, cough, or SOB, along with taking temperature via forehead thermometer. Patient presented with all negative signs and symptoms and had no fever >100 degrees Fahrenheit this date. TU.26 seconds  DELACRUZ/50  30 second STS: 7 with unilateral UE assist    Strength RLE  R Hip Flexion: 4+/5  R Hip ABduction: 4+/5  R Hip ADduction: 5/5  R Knee Flexion: 4+/5  R Knee Extension: 4+/5     Strength LLE  L Hip Flexion: 4+/5  L Hip ABduction: 4+/5  L Hip ADduction: 5/5  L Knee Flexion: 4+/5  L Knee Extension: 4+/5    Lumbar AROM: flex: 50% ext: 25% BL SB: 25% BL Rot: 50%  C spine AROM: flex: WFL ext: WFL R SB: 26 deg, L SB: 28 deg. R Rot: 42 deg. L Rot: 38 deg.   BUE gross strength: 4+/5      Exercises: (No more than 4 columns)   Exercise/Equipment 21 #6 21 #7 3/5/21 #8           WARM UP         Nustep L3 x9 L5 x9' L4 x7'         TABLE      Bridges 3x10 2x10    Clams      Seated marches      SLR x12 ea BLE X10, BLE 1#    SAQ      LAQ      Chin tucks   X10, 5\"   UT stretch   2x30\" BUE         PPT   2x10, 5\"     LTR   x10 ea side BLE   STANDING      STS x10 x15    Standing hip abd      Standing marches  On airex: 2x10 ea BLE    Stretch for putting on socks                             PROPRIOCEPTION      Static balance Foam: EO/EC romberg, EO/EC 1/2 tandem, SLS EO Foam: EO/EC romberg, EO/EC 1/2 tandem, SLS EO    Step up and over x10 4\" x10 4\"    Foam beam  Sidestepping x3 laps    Dynamic standing   Tandem   Sidestepping BL    Cone tap      Rocker board  AP  Medial lateral     Jacobson & Tug x5'                     Other Therapeutic Activities/Education:  HEP and importance of completion. Home Exercise Program:    1/7: STS, standing jaspreet tran bridges  1/18: replaced bridge with SLR    Manual Treatments:  none       Modalities:  none      Communication with other providers:  POC sent      Assessment: 0/10 end pain. Pt tolerated today's treatment without any adverse reactions or complications this date. Upon assessment of addition of neck and low back pain, pt presents with impairments in both L and C spine AROM, neck and LBP, some radicular symptom and overall reduced independence with ADL/IADL activity completion. PT has added new dx and goals to recent POC to address all issues at this time. Pt would continue to benefit from skilled therapy interventions to address remaining impairments, improve mobility and strength and progress toward goal completion while reducing risk for re-injury or further decline.     Plan for Next Session:  nustepPHILIPPE strength, focus on balance and gait    Time In / Time Out: 3804-3823    Timed Code/Total Treatment Minutes:  39': 23' TE x2,  22' Reeval x1    Next Progress Note due:  10th visit or 30 days    Plan of Care Interventions:  [x] Therapeutic Exercise  [x] Modalities:  [x] Therapeutic Activity     [] Ultrasound  [x] Estim  [x] Gait Training      [] Cervical Traction [] Lumbar Traction  [x] Neuromuscular Re-education    [x] Cold/hotpack [] Iontophoresis   [x] Instruction in HEP      [x] Vasopneumatic   [] Dry Needling    [x] Manual Therapy               [] Aquatic Therapy              Electronically signed by:  Stacie Keene, PT, DPT 3/5/2021, 6:50 AM

## 2021-03-05 NOTE — PROGRESS NOTES
Outpatient Physical Therapy           [place]           [x] Phone: [phone]   Fax: [phone]  [facility]           [] Phone: [phone]   Fax: [phone]      To: REJI Arrieta    From: Sylvia Rose PT, DPT     Patient: Jones Douglas                  : 1945  Diagnosis:     Multiple falls. OA involving multiple sites but not designated as generalized, neck pain, spinal stenosis of L region  Date: 3/5/2021  Treatment Diagnosis:   Impaired balance, BLE weakness        [x]  Progress Note/ Re-eval                []  Discharge Note    Evaluation Date:  21  Total Visits to date: 8  Cancels/No-shows to date: 2    Subjective:  Pt notes his LBP and neck pain are chronic and his neck is worse than the back. Pt notes that he has a hard time turning his head to the R and sometimes his RUE will fall asleep. Pt does note that he is looking into silver sneakers for overall improved endurance and strength. He states he occasionally has N/T into his upper legs.    NDI:     Plan of Care/Treatment to date:  [x] Therapeutic Exercise    [x] Modalities:  [x] Therapeutic Activity     [x] Ultrasound  [x] Electrical Stimulation  [x] Gait Training      [] Cervical Traction   [x] Lumbar Traction  [x] Neuromuscular Re-education  [x] Cold/hotpack [] Iontophoresis  [x] Instruction in HEP      Other:  [x] Manual Therapy       []  Vasopneumatic  [] Aquatic Therapy       [x]   Dry Needle Therapy                      Objective/Significant Findings At Last Visit/Comments:    TU.26 seconds  DELACRUZ/50  30 second STS: 7 with unilateral UE assist     Strength RLE  R Hip Flexion: 4+/5  R Hip ABduction: 4+/5  R Hip ADduction: 5/5  R Knee Flexion: 4+/5  R Knee Extension: 4+/5     Strength LLE  L Hip Flexion: 4+/5  L Hip ABduction: 4+/5  L Hip ADduction: 5/5  L Knee Flexion: 4+/5  L Knee Extension: 4+/5     Lumbar AROM: flex: 50% ext: 25% BL SB: 25% BL Rot: 50%  C spine AROM: flex: WFL ext: WFL R SB: 26

## 2021-03-11 ENCOUNTER — HOSPITAL ENCOUNTER (OUTPATIENT)
Dept: PHYSICAL THERAPY | Age: 76
Setting detail: THERAPIES SERIES
Discharge: HOME | End: 2021-03-11
Payer: MEDICARE

## 2021-03-11 PROCEDURE — 97110 THERAPEUTIC EXERCISES: CPT

## 2021-03-11 PROCEDURE — 97530 THERAPEUTIC ACTIVITIES: CPT

## 2021-03-11 PROCEDURE — 97112 NEUROMUSCULAR REEDUCATION: CPT

## 2021-03-11 NOTE — FLOWSHEET NOTE
Outpatient Physical Therapy  [place]           [x] Phone: [phone]   Fax: [phone]  [facility]           [] Phone: [phone]   Fax: [phone]        Physical Therapy Daily Treatment Note  Date:  3/11/2021    Patient Name:  Hiren Wright :  1945  MRN: [id number]  Restrictions/Precautions:   Prior bladder CA - caution with modalities   Diagnosis:   Multiple falls. OA involving multiple sites but not designated as generalized, spinal stenosis L region, neck pain  Date of Injury/Surgery:   Treatment Diagnosis:  Impaired balance, BLE weakness, neck pain    Insurance/Certification information: PT Insurance Information: Medicare   Referring Physician:  Referring Practitioner: REJI Carlin  Next Doctor Visit:    Plan of care signed (Y/N):  yes   Outcome Measure: Doren Eye: 49/56. TU.26 seconds. 30 sec STS: 7 w/ unilateral UE assist     NDI:   Visit# / total visits:   Pain level: 2/10 - Neck pain  Goals:        Short term goals  Time Frame for Short term goals: 5 visits  Short term goal 1: Pt will be Ind with HEP in order to maximize recovery outside of clinic: Goal met   Long term goals  Time Frame for Long term goals : 10 visits  Long term goal 1: Pt will improve BLE strength to gross 4+/5 to aide in gait: GOAL Met 2/2  Long term goal 2: Pt will reduce TUG score to 12.5 seconds or less to show reduced fall risk: GOAL Met 2/2  Long term goal 3: Pt will improve Jacobson score to 50 or higher to reduce fall risk: Almost Met 2/2  Long term goal 4: Pt will improve 30 second STS score to 7 or more to show improved balance : GOAL Met 2/2  Long term goal 5: Pt will improve BL C spine Rot to 45 deg or higher to aide in ADLs: New goal 3/5  Long term goal 6: Pt will improve BL SB L spine to within 50% normal motion to aide in ADLs: New goal 3/5    Summary of Evaluation: Assessment: Pt is a 64-year-old male who has worsening balance for years.  It has progressed to the point of 3 major falls within the last 3 months. Pt presents with impairments in balance, impaired gait, impaired BLE strength and overall reduced independence with safe ADL/IADL completion. Pt would benefit from skilled therapy interventions to address listed impairments, progress toward goal completion and improve ADL/IADL status. PT also warranted to reduce risk for future falls/injury or further decline. Subjective: Pt states he is doing well this date. States he tried his new HEP and its going well but some difficulty with muscle contraction. Any changes in Ambulatory Summary Sheet? None      Objective:    Prior to today's treatment session, patient was screened for signs and symptoms related to COVID-19 including but not limited to verbally answering questions related to feeling ill, cough, or SOB, along with taking temperature via forehead thermometer.  Patient presented with all negative signs and symptoms and had no fever >100 degrees Fahrenheit this date.     -fair contraction of TA w/ PPT    Exercises: (No more than 4 columns)   Exercise/Equipment 2/9/21 #7 3/5/21 #8 3/11/21 #9           WARM UP         Nustep L5 x9' L4 x7' L5 x 5'         TABLE      Bridges 2x10     Clams      Seated marches      SLR X10, BLE 1#  X10, BLE 1#   SAQ      LAQ      Chin tucks  X10, 5\" X10, 5\"   UT stretch  2x30\" BUE 2x30\" BUE         PPT  2x10, 5\" X10, 5\"     LTR  x10 ea side BLE x10 ea side BLE   STANDING      STS x15  x10   Standing hip abd      Standing marches On airex: 2x10 ea BLE  On foam x10 ea BLE   Stretch for putting on socks                             PROPRIOCEPTION      Static balance Foam: EO/EC romberg, EO/EC 1/2 tandem, SLS EO  Foam: EO/EC romberg, EO/EC 1/2 tandem   Step up and over x10 4\"     Foam beam Sidestepping x3 laps     Dynamic standing  Tandem   Sidestepping BL     Cone tap      Rocker board AP  Medial lateral      Jacobson & Tug                      Other Therapeutic Activities/Education: HEP and importance of completion. Home Exercise Program:    1/7: STS, standing marches, clams, bridges  1/18: replaced bridge with SLR    Manual Treatments:  none     Modalities:  none    Communication with other providers:  POC sent    Assessment: 0/10 end pain. Pt tolerated today's treatment without any adverse reactions or complications this date. Pt continues to require frequent redirection to task during therapy sessions. He is doing well with strengthening exercises but requires frequent breaks. Pt would continue to benefit from skilled therapy interventions to address remaining impairments, improve mobility and strength and progress toward goal completion while reducing risk for re-injury or further decline.     Plan for Next Session:  nustep, BLE strength, balance    Time In / Time Out: 0315 - 1424    Timed Code/Total Treatment Minutes:  44': 9' TA x1, 11' NMR x1, 19' TE x1    Next Progress Note due:  Visit 18    Plan of Care Interventions:  [x] Therapeutic Exercise  [x] Modalities:  [x] Therapeutic Activity     [] Ultrasound  [x] Estim  [x] Gait Training      [] Cervical Traction [] Lumbar Traction  [x] Neuromuscular Re-education    [x] Cold/hotpack [] Iontophoresis   [x] Instruction in HEP      [x] Vasopneumatic   [] Dry Needling    [x] Manual Therapy               [] Aquatic Therapy              Electronically signed by:  Jake Martinez PT, DPT 3/11/2021, 1:23 PM

## 2021-03-13 ENCOUNTER — HOSPITAL ENCOUNTER (OUTPATIENT)
Dept: PHYSICAL THERAPY | Age: 76
Setting detail: THERAPIES SERIES
Discharge: HOME | End: 2021-03-13
Payer: MEDICARE

## 2021-03-16 ENCOUNTER — HOSPITAL ENCOUNTER (OUTPATIENT)
Dept: PHYSICAL THERAPY | Age: 76
Setting detail: THERAPIES SERIES
Discharge: HOME | End: 2021-03-16
Payer: MEDICARE

## 2021-03-16 PROCEDURE — 97110 THERAPEUTIC EXERCISES: CPT

## 2021-03-16 PROCEDURE — 97140 MANUAL THERAPY 1/> REGIONS: CPT

## 2021-03-16 PROCEDURE — 97112 NEUROMUSCULAR REEDUCATION: CPT

## 2021-03-16 NOTE — FLOWSHEET NOTE
Outpatient Physical Therapy  [place]           [x] Phone: [phone]   Fax: [phone]  [place]           [] Phone: [phone]   Fax: [phone]        Physical Therapy Daily Treatment Note  Date:  3/16/2021    Patient Name:  Jovanna Osullivan :  1945  MRN: [id number]  Restrictions/Precautions:   Prior bladder CA - caution with modalities   Diagnosis:   Multiple falls. OA involving multiple sites but not designated as generalized, spinal stenosis L region, neck pain  Date of Injury/Surgery:   Treatment Diagnosis:  Impaired balance, BLE weakness, neck pain    Insurance/Certification information: PT Insurance Information: Medicare   Referring Physician:  Referring Practitioner: REJI Peace  Next Doctor Visit:    Plan of care signed (Y/N):  yes   Outcome Measure: Kellie Hill: 49/56. TU.26 seconds. 30 sec STS: 7 w/ unilateral UE assist     NDI:   Visit# / total visits: 10/20  Pain level: 7-8/10 - Neck pain  Goals:        Short term goals  Time Frame for Short term goals: 5 visits  Short term goal 1: Pt will be Ind with HEP in order to maximize recovery outside of clinic: Goal met   Long term goals  Time Frame for Long term goals : 10 visits  Long term goal 1: Pt will improve BLE strength to gross 4+/5 to aide in gait: GOAL Met 2/2  Long term goal 2: Pt will reduce TUG score to 12.5 seconds or less to show reduced fall risk: GOAL Met 2/2  Long term goal 3: Pt will improve Jacobson score to 50 or higher to reduce fall risk: Almost Met 2/2  Long term goal 4: Pt will improve 30 second STS score to 7 or more to show improved balance : GOAL Met 2/2  Long term goal 5: Pt will improve BL C spine Rot to 45 deg or higher to aide in ADLs: New goal 3/5  Long term goal 6: Pt will improve BL SB L spine to within 50% normal motion to aide in ADLs: New goal 3/5    Summary of Evaluation: Assessment: Pt is a 78-year-old male who has worsening balance for years.  It has progressed to the point of 3 major falls within the last 3 months. Pt presents with impairments in balance, impaired gait, impaired BLE strength and overall reduced independence with safe ADL/IADL completion. Pt would benefit from skilled therapy interventions to address listed impairments, progress toward goal completion and improve ADL/IADL status. PT also warranted to reduce risk for future falls/injury or further decline. Subjective: Pt states his back is feeling very good this date. He does have significant neck pain this date however. Any changes in Ambulatory Summary Sheet? None      Objective:    Prior to today's treatment session, patient was screened for signs and symptoms related to COVID-19 including but not limited to verbally answering questions related to feeling ill, cough, or SOB, along with taking temperature via forehead thermometer. Patient presented with all negative signs and symptoms and had no fever >100 degrees Fahrenheit this date.     -fair contraction of TA w/ PPT    Exercises: (No more than 4 columns)   Exercise/Equipment 3/5/21 #8 3/11/21 #9 3/16/21 #10           WARM UP         Nustep L4 x7' L5 x 5' L5 x5'         TABLE      Bridges   x10   Clams      Seated marches      SLR  X10, BLE 1#    SAQ      LAQ   2x10, 1#   Chin tucks X10, 5\" X10, 5\" X15, 5\"   UT stretch 2x30\" BUE 2x30\" BUE 2x30\" BUE         PPT 2x10, 5\" X10, 5\" X10, 5\"     LTR x10 ea side BLE x10 ea side BLE x10 ea direction   STANDING      STS  x10 x15   Standing hip abd      Standing marches  On foam x10 ea BLE Foam x10 ea BLE   Stretch for putting on socks                             PROPRIOCEPTION      Static balance  Foam: EO/EC romberg, EO/EC 1/2 tandem Foam: EO/EC romberg, EO/EC 1/2 tandem   SLS   Floor - difficulty keeping foot up   Step up and over      Foam beam      Dynamic standing       Cone tap      Rocker board      Jacobson & Tug                Other Therapeutic Activities/Education:  HEP and importance of completion. Home Exercise Program:    1/7: STS, standing marches, clams, bridges  1/18: replaced bridge with SLR    Manual Treatments:  STM BL UT/LS/ suboccipitals, manual cervical distraction, manual UT stretching     Modalities:  none    Communication with other providers:  POC sent    Assessment: 0/10 end pain. Pt tolerated today's treatment without any adverse reactions or complications this date. Pt noted neck pain went from a 7-8 to 0 after manual therapy this date. Pt would continue to benefit from skilled therapy interventions to address remaining impairments, improve mobility and strength and progress toward goal completion while reducing risk for re-injury or further decline.     Plan for Next Session:  nustep, BLE strength, balance    Time In / Time Out: 3093 - 0233     Timed Code/Total Treatment Minutes:  37': 8' MT x1, 11' NMR x1, 24' TE x1    Next Progress Note due:  Visit 18    Plan of Care Interventions:  [x] Therapeutic Exercise  [x] Modalities:  [x] Therapeutic Activity     [] Ultrasound  [x] Estim  [x] Gait Training      [] Cervical Traction [] Lumbar Traction  [x] Neuromuscular Re-education    [x] Cold/hotpack [] Iontophoresis   [x] Instruction in HEP      [x] Vasopneumatic   [] Dry Needling    [x] Manual Therapy               [] Aquatic Therapy              Electronically signed by:  Luz Marina Monique PT, DPT 3/16/2021, 6:49 AM

## 2021-03-18 ENCOUNTER — HOSPITAL ENCOUNTER (OUTPATIENT)
Dept: PHYSICAL THERAPY | Age: 76
Setting detail: THERAPIES SERIES
Discharge: HOME | End: 2021-03-18
Payer: MEDICARE

## 2021-03-18 PROCEDURE — 97112 NEUROMUSCULAR REEDUCATION: CPT

## 2021-03-18 PROCEDURE — 97140 MANUAL THERAPY 1/> REGIONS: CPT

## 2021-03-18 PROCEDURE — 97110 THERAPEUTIC EXERCISES: CPT

## 2021-03-18 NOTE — FLOWSHEET NOTE
Outpatient Physical Therapy  [place]           [x] Phone: [phone]   Fax: [phone]  [facility]           [] Phone: [phone]   Fax: [phone]        Physical Therapy Daily Treatment Note  Date:  3/18/2021    Patient Name:  Gallito Paulino :  1945  MRN: [id number]  Restrictions/Precautions:   Prior bladder CA - caution with modalities   Diagnosis:   Multiple falls. OA involving multiple sites but not designated as generalized, spinal stenosis L region, neck pain  Date of Injury/Surgery:   Treatment Diagnosis:  Impaired balance, BLE weakness, neck pain    Insurance/Certification information: PT Insurance Information: Medicare   Referring Physician:  Referring Practitioner: REJI Melendrez  Next Doctor Visit:    Plan of care signed (Y/N):  yes   Outcome Measure: Jacobson/56     TU.26 seconds. 30 sec STS: 7 w/ unilateral UE assist     NDI:   Visit# / total visits:   Pain level: 6-7/10 - Neck pain  Goals:        Short term goals  Time Frame for Short term goals: 5 visits  Short term goal 1: Pt will be Ind with HEP in order to maximize recovery outside of clinic: Goal met   Long term goals  Time Frame for Long term goals : 10 visits  Long term goal 1: Pt will improve BLE strength to gross 4+/5 to aide in gait: GOAL Met 2/2  Long term goal 2: Pt will reduce TUG score to 12.5 seconds or less to show reduced fall risk: GOAL Met 2/2  Long term goal 3: Pt will improve Jacobson score to 50 or higher to reduce fall risk: Almost Met 2/2  Long term goal 4: Pt will improve 30 second STS score to 7 or more to show improved balance : GOAL Met 2/2  Long term goal 5: Pt will improve BL C spine Rot to 45 deg or higher to aide in ADLs: New goal 3/5  Long term goal 6: Pt will improve BL SB L spine to within 50% normal motion to aide in ADLs: New goal 3/5    Summary of Evaluation: Assessment: Pt is a 64-year-old male who has worsening balance for years.  It has progressed to the point of 3 major falls within the last 3 months. Pt presents with impairments in balance, impaired gait, impaired BLE strength and overall reduced independence with safe ADL/IADL completion. Pt would benefit from skilled therapy interventions to address listed impairments, progress toward goal completion and improve ADL/IADL status. PT also warranted to reduce risk for future falls/injury or further decline. Subjective: Pt states he is doing well this date. His neck is slightly better than last session, but still more bothersome than usual.     Any changes in Ambulatory Summary Sheet? None      Objective:    Prior to today's treatment session, patient was screened for signs and symptoms related to COVID-19 including but not limited to verbally answering questions related to feeling ill, cough, or SOB, along with taking temperature via forehead thermometer.  Patient presented with all negative signs and symptoms and had no fever >100 degrees Fahrenheit this date.     -fair contraction of TA w/ PPT    Exercises: (No more than 4 columns)   Exercise/Equipment 3/11/21 #9 3/16/21 #10 3/18/21 #11           WARM UP         Nustep L5 x 5' L5 x5' x7' L5         TABLE      Bridges  x10 x20   Clams      Seated marches      SLR X10, BLE 1#  x15 ea BLE   SAQ      LAQ  2x10, 1#    Chin tucks X10, 5\" X15, 5\" X15, 5\"   UT stretch 2x30\" BUE 2x30\" BUE          PPT X10, 5\" X10, 5\" X20, 5\"     LTR x10 ea side BLE x10 ea direction    STANDING      STS x10 x15 x20   Standing hip abd      Standing marches On foam x10 ea BLE Foam x10 ea BLE    Stretch for putting on socks                             PROPRIOCEPTION      Static balance Foam: EO/EC romberg, EO/EC 1/2 tandem Foam: EO/EC romberg, EO/EC 1/2 tandem Foam: EO/EC romberg, EO/EC 1/2 tandem   SLS  Floor - difficulty keeping foot up    Step up and over      Foam beam      Dynamic standing    tandem   Cone tap      Rocker board      Dosanjeev 30                Other Therapeutic Activities/Education:  HEP and importance of completion. Home Exercise Program:    1/7: STS, standing marches, clams, bridges  1/18: replaced bridge with SLR    Manual Treatments:  STM BL UT/LS/ suboccipitals, manual cervical distraction, manual UT stretching, manual rotation C spine     Modalities:  none    Communication with other providers:  POC sent    Assessment: 2/10 end pain. Pt tolerated today's treatment without any adverse reactions or complications this date. Pt again had less pain in neck after manual this date and was feeling better when he left. Pt would continue to benefit from skilled therapy interventions to address remaining impairments, improve mobility and strength and progress toward goal completion while reducing risk for re-injury or further decline.     Plan for Next Session:  PHILIPPE chairez strength, balance    Time In / Time Out: 1300 - 1338    Timed Code/Total Treatment Minutes:  45': 11' MT x1, 11' NMR x1, 16' TE x1    Next Progress Note due:  Visit 18    Plan of Care Interventions:  [x] Therapeutic Exercise  [x] Modalities:  [x] Therapeutic Activity     [] Ultrasound  [x] Estim  [x] Gait Training      [] Cervical Traction [] Lumbar Traction  [x] Neuromuscular Re-education    [x] Cold/hotpack [] Iontophoresis   [x] Instruction in HEP      [x] Vasopneumatic   [] Dry Needling    [x] Manual Therapy               [] Aquatic Therapy              Electronically signed by:  Jake Martinez PT, DPT 3/18/2021, 9:20 AM

## 2021-03-23 ENCOUNTER — HOSPITAL ENCOUNTER (OUTPATIENT)
Dept: PHYSICAL THERAPY | Age: 76
Setting detail: THERAPIES SERIES
Discharge: HOME | End: 2021-03-23
Payer: MEDICARE

## 2021-03-23 PROCEDURE — 97110 THERAPEUTIC EXERCISES: CPT

## 2021-03-23 PROCEDURE — 97112 NEUROMUSCULAR REEDUCATION: CPT

## 2021-03-23 PROCEDURE — 97140 MANUAL THERAPY 1/> REGIONS: CPT

## 2021-03-23 NOTE — FLOWSHEET NOTE
the point of 3 major falls within the last 3 months. Pt presents with impairments in balance, impaired gait, impaired BLE strength and overall reduced independence with safe ADL/IADL completion. Pt would benefit from skilled therapy interventions to address listed impairments, progress toward goal completion and improve ADL/IADL status. PT also warranted to reduce risk for future falls/injury or further decline. Subjective: Pt states his neck feels better following sessions but does not last. Overall does feel his neck is moving better but pain is staying the same. Not taking Gabapentin this week due to having to take another medication for UTI so currently not sleeping well and having muscle cramps. Any changes in Ambulatory Summary Sheet? None      Objective:    Prior to today's treatment session, patient was screened for signs and symptoms related to COVID-19 including but not limited to verbally answering questions related to feeling ill, cough, or SOB, along with taking temperature via forehead thermometer.  Patient presented with all negative signs and symptoms and had no fever >100 degrees Fahrenheit this date.     -fair contraction of TA w/ PPT    Exercises: (No more than 4 columns)   Exercise/Equipment 3/11/21 #9 3/16/21 #10 3/18/21 #11 3/23/21 #12            WARM UP          Nustep L5 x 5' L5 x5' x7' L5 x7' L5          TABLE       Bridges  x10 x20  x 20   Clams       Seated marches       SLR X10, BLE 1#  x15 ea BLE x15 ea BLE 1#   SAQ       LAQ  2x10, 1#     Chin tucks X10, 5\" X15, 5\" X15, 5\" X15, 5\"   UT stretch 2x30\" BUE 2x30\" BUE            PPT X10, 5\" X10, 5\" X20, 5\" X20, 5\"     LTR x10 ea side BLE x10 ea direction     STANDING       STS x10 x15 x20  x 20   Standing hip abd       Standing marches On foam x10 ea BLE Foam x10 ea BLE     Stretch for putting on socks                                 PROPRIOCEPTION       Static balance Foam: EO/EC romberg, EO/EC 1/2 tandem Foam: EO/EC romberg, EO/EC

## 2021-03-26 ENCOUNTER — HOSPITAL ENCOUNTER (OUTPATIENT)
Dept: PHYSICAL THERAPY | Age: 76
Discharge: HOME | End: 2021-03-26

## 2021-03-26 NOTE — FLOWSHEET NOTE
Physical Therapy  Cancellation/No-show Note  Patient Name:  Faisal Wagoner  :  1945   Date:  3/26/2021  Cancelled visits to date: 3  No-shows to date: 0    For today's appointment patient:  [x]  Cancelled  []  Rescheduled appointment  []  No-show     Reason given by patient:  []  Patient ill  []  Conflicting appointment  []  No transportation    []  Conflict with work  []  No reason given  [x]  Other:     Comments:  Second COVID shot yesterday    Electronically signed by:  Elisabeth Wick PT, DPT 3/26/2021, 8:10 AM

## 2021-03-30 ENCOUNTER — HOSPITAL ENCOUNTER (OUTPATIENT)
Dept: PHYSICAL THERAPY | Age: 76
Setting detail: THERAPIES SERIES
Discharge: HOME | End: 2021-03-30
Payer: MEDICARE

## 2021-03-30 PROCEDURE — 97110 THERAPEUTIC EXERCISES: CPT

## 2021-03-30 PROCEDURE — 97112 NEUROMUSCULAR REEDUCATION: CPT

## 2021-03-30 NOTE — FLOWSHEET NOTE
Patients Plan of Care was received and signed. Signed POC was scanned and placed in the patients chart.     Jacky Judd

## 2021-03-30 NOTE — FLOWSHEET NOTE
Outpatient Physical Therapy  [place]           [x] Phone: [phone]   Fax: [phone]  [facility]           [] Phone: [phone]   Fax: [phone]        Physical Therapy Daily Treatment Note  Date:  3/30/2021    Patient Name:  Jovanna Osullivan :  1945  MRN: [id number]  Restrictions/Precautions:   Prior bladder CA - caution with modalities   Diagnosis:   Multiple falls. OA involving multiple sites but not designated as generalized, spinal stenosis L region, neck pain  Date of Injury/Surgery:   Treatment Diagnosis:  Impaired balance, BLE weakness, neck pain    Insurance/Certification information: PT Insurance Information: Medicare   Referring Physician:  Referring Practitioner: REJI Peace  Next Doctor Visit:    Plan of care signed (Y/N):  yes   Outcome Measure: Jacobson/56     TU.34 seconds. 30 sec STS: 7 w/ unilateral UE assist     NDI:   Visit# / total visits:   Pain level: 5/10 - Neck pain  Goals:        Short term goals  Time Frame for Short term goals: 5 visits  Short term goal 1: Pt will be Ind with HEP in order to maximize recovery outside of clinic: Goal Remet 3/30  Long term goals  Time Frame for Long term goals : 10 visits  Long term goal 1: Pt will improve BLE strength to gross 4+/5 to aide in gait: GOAL ReMet 3/30  Long term goal 2: Pt will reduce TUG score to 12.5 seconds or less to show reduced fall risk: GOAL ReMet 3/30  Long term goal 3: Pt will improve Jacobson score to 50 or higher to reduce fall risk: Almost Met 3/30  Long term goal 4: Pt will improve 30 second STS score to 7 or more to show improved balance : GOAL ReMet 3/30  Long term goal 5: Pt will improve BL C spine Rot to 45 deg or higher to aide in ADLs: GOAL MET 3/30  Long term goal 6: Pt will improve BL SB L spine to within 50% normal motion to aide in ADLs: Almost MET 3/30    Summary of Evaluation: Assessment: Pt is a 69-year-old male who has worsening balance for years.  It has progressed to the point of 3 major falls within the last 3 months. Pt presents with impairments in balance, impaired gait, impaired BLE strength and overall reduced independence with safe ADL/IADL completion. Pt would benefit from skilled therapy interventions to address listed impairments, progress toward goal completion and improve ADL/IADL status. PT also warranted to reduce risk for future falls/injury or further decline. Subjective: Pt states he is doing well this date. 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 Pt reports that he is now able to carry some of the roasters at the soup kitchen while walking, which he was unable to do before therapy start. He reports he is no longer stumbling when he walks. He agrees w/ plan to finish additional 3 visits scheduled and dc next week for continuation with HEP. Any changes in Ambulatory Summary Sheet? None      Objective:    Prior to today's treatment session, patient was screened for signs and symptoms related to COVID-19 including but not limited to verbally answering questions related to feeling ill, cough, or SOB, along with taking temperature via forehead thermometer. Patient presented with all negative signs and symptoms and had no fever >100 degrees Fahrenheit this date. Jacobson/56  TU.34 seconds. 30 sec STS: 7 w/ unilateral UE assist     Strength RLE  R Hip Flexion: 4+/5  R Hip ABduction: 4+/5  R Hip ADduction: 5/5  R Knee Flexion: 4+/5  R Knee Extension: 4+/5     Strength LLE  L Hip Flexion: 4+/5  L Hip ABduction: 4+/5  L Hip ADduction: 5/5  L Knee Flexion: 4+/5  L Knee Extension: 4+/5     Lumbar AROM: flex: 50% ext: 25% BL SB: 40% BL Rot: 50%  C spine AROM: flex: WFL ext: WFL R SB: 26 deg, L SB: 28 deg. R Rot: 62 deg. L Rot: 65 deg.   BUE gross strength: 4+/5    Exercises: (No more than 4 columns) Exercise/Equipment 3/18/21 #11 3/23/21 #12 3/30/21 #13           WARM UP         Nustep x7' L5 x7' L5 8' L5         TABLE      Bridges x20  x 20    Clams      Seated marches      SLR x15 ea BLE x15 ea BLE 1#    SAQ      LAQ   1# x20 ea BLE   Chin tucks X15, 5\" X15, 5\"    UT stretch            PPT X20, 5\" X20, 5\"      LTR      STANDING      STS x20  x 20 x10   Standing hip abd   1# x10 ea BLE   Standing marches   1# x10 ea BLE   Stretch for putting on socks      Standing ham curls   1# x10 ea BLE                    PROPRIOCEPTION      Static balance Foam: EO/EC romberg, EO/EC 1/2 tandem Foam: EO/EC romberg, EO/EC 1/2 tandem DELACRUZ   SLS      Step up and over      Foam beam      Dynamic standing  tandem Tandem stance 20 sec R/L fwd 2x    Cone tap      Rocker board      Delacruz & Tug                Other Therapeutic Activities/Education:  HEP and importance of completion. DC plans    Home Exercise Program:    1/7: STS, standing marches, clams, bridges  1/18: replaced bridge with SLR    Manual Treatments:       Modalities:  none    Communication with other providers:  POC sent    Assessment: 2/10 end pain. Pt tolerated today's treatment without any adverse reactions or complications this date. Pt continues to demonstrate improvement with all aspects of care, both including original dx of balance and gait and recently with improvement in LBP and neck pain. Pt has met almost all goals at this time and has shown improvement in strength, ROM, balance and functional mobility independence. Pt to dc for HEP after 3 more visits. Pt would continue to benefit from skilled therapy interventions to address remaining impairments, improve mobility and strength and progress toward goal completion while reducing risk for re-injury or further decline.     Plan for Next Session:  nustep BLE strength, balance    Time In / Time Out: 1286-7805    Timed Code/Total Treatment Minutes: 44': 11' NMR x1, 35' TE x2    Next Progress Note due:  Visit 18 Plan of Care Interventions:  [x] Therapeutic Exercise  [x] Modalities:  [x] Therapeutic Activity     [] Ultrasound  [x] Estim  [x] Gait Training      [] Cervical Traction [] Lumbar Traction  [x] Neuromuscular Re-education    [x] Cold/hotpack [] Iontophoresis   [x] Instruction in HEP      [x] Vasopneumatic   [] Dry Needling    [x] Manual Therapy               [] Aquatic Therapy              Electronically signed by:  Anjel Vora PT, DPT  3/30/2021, 6:52 AM

## 2021-03-30 NOTE — PROGRESS NOTES
Outpatient Physical Therapy           [place]           [x] Phone: [phone]   Fax: [phone]  [place] [place]           [] Phone: [phone]   Fax: [phone]      To: REJI Solorzano    From: Luz Marina Monique, PT, DPT     Patient: Kamilla Cornelius                  : 1945  Diagnosis:     Multiple falls. OA involving multiple sites but not designated as generalized, neck pain, spinal stenosis of L region  Date: 3/30/2021  Treatment Diagnosis:   Impaired balance, BLE weakness        [x]  Progress Note              []  Discharge Note    Evaluation Date:  21  Total Visits to date: 15  Cancels/No-shows to date: 3    Subjective:  Pt states he is doing well this date. 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 Pt reports that he is now able to carry some of the roasters at the [employer] kitchen while walking, which he was unable to do before therapy start. He reports he is no longer stumbling when he walks. He agrees w/ plan to finish additional 3 visits scheduled and dc next week for continuation with HEP. Plan of Care/Treatment to date:  [x] Therapeutic Exercise    [x] Modalities:  [x] Therapeutic Activity     [x] Ultrasound  [x] Electrical Stimulation  [x] Gait Training      [] Cervical Traction   [x] Lumbar Traction  [x] Neuromuscular Re-education  [x] Cold/hotpack [] Iontophoresis  [x] Instruction in HEP      Other:  [x] Manual Therapy       []  Vasopneumatic  [] Aquatic Therapy       [x]   Dry Needle Therapy                      Objective/Significant Findings At Last Visit/Comments:       Jacobson/56  TU.34 seconds.    30 sec STS: 7 w/ unilateral UE assist     Strength RLE  R Hip Flexion: 4+/5  R Hip ABduction: 4+/5  R Hip ADduction: 5/5  R Knee Flexion: 4+/5  R Knee Extension: 4+/5     Strength LLE  L Hip Flexion: 4+/5  L Hip ABduction: 4+/5  L Hip ADduction: 5/5 L Knee Flexion: 4+/5  L Knee Extension: 4+/5     Lumbar AROM: flex: 50% ext: 25% BL SB: 40% BL Rot: 50%  C spine AROM: flex: WFL ext: WFL R SB: 26 deg, L SB: 28 deg. R Rot: 62 deg. L Rot: 65 deg. BUE gross strength: 4+/5    Assessment: Pt continues to demonstrate improvement with all aspects of care, both including original dx of balance and gait and recently with improvement in LBP and neck pain. Pt has met almost all goals at this time and has shown improvement in strength, ROM, balance and functional mobility independence. Pt to dc for HEP after 3 more visits. Pt would continue to benefit from skilled therapy interventions to address remaining impairments, improve mobility and strength and progress toward goal completion while reducing risk for re-injury or further decline.     Goal Status:  [] Achieved [x] Partially Achieved  [] Not Achieved     Changes to goals:    Short term goals  Time Frame for Short term goals: 5 visits  Short term goal 1: Pt will be Ind with HEP in order to maximize recovery outside of clinic: Goal Remet 3/30  Long term goals  Time Frame for Long term goals : 10 visits  Long term goal 1: Pt will improve BLE strength to gross 4+/5 to aide in gait: GOAL ReMet 3/30  Long term goal 2: Pt will reduce TUG score to 12.5 seconds or less to show reduced fall risk: GOAL ReMet 3/30  Long term goal 3: Pt will improve Jacobson score to 50 or higher to reduce fall risk: Almost Met 3/30  Long term goal 4: Pt will improve 30 second STS score to 7 or more to show improved balance : GOAL ReMet 3/30  Long term goal 5: Pt will improve BL C spine Rot to 45 deg or higher to aide in ADLs: GOAL MET 3/30  Long term goal 6: Pt will improve BL SB L spine to within 50% normal motion to aide in ADLs: Almost MET 3/30       Frequency/Duration:  # Days per week: [] 1 day # Weeks: [] 1 week [] 4 weeks [] 8 weeks     [x] 2 days   [] 2 weeks [x] 5 weeks [] 10 weeks     [] 3 days   [] 3 weeks [] 6 weeks [] 12 weeks       Rehab Potential: [] Excellent [x] Good [] Fair  [] Poor      Patient Status: [x] Continue per initial plan of Care (since last PN/reeval)     [] Patient now discharged     [] Additional visits requested, Please re-certify for additional visits:      Requested frequency/duration:  2 days/week for 5 weeks    If we are requesting more visits, we fully anticipate the patient's condition is expected to improve within the treatment timeframe we are requesting. Electronically signed by:  Flores Christensen PT, DPT 3/30/2021, 6:55 AM    If you have any questions or concerns, please don't hesitate to call.   Thank you for your referral.    Physician Signature:______________________ Date:______ Time: ________  By signing above, therapists plan is approved by physician

## 2021-04-01 ENCOUNTER — HOSPITAL ENCOUNTER (OUTPATIENT)
Dept: PHYSICAL THERAPY | Age: 76
Setting detail: THERAPIES SERIES
Discharge: HOME | End: 2021-04-01
Payer: MEDICARE

## 2021-04-01 PROCEDURE — 97112 NEUROMUSCULAR REEDUCATION: CPT

## 2021-04-01 PROCEDURE — 97530 THERAPEUTIC ACTIVITIES: CPT

## 2021-04-01 PROCEDURE — 97110 THERAPEUTIC EXERCISES: CPT

## 2021-04-01 NOTE — FLOWSHEET NOTE
Outpatient Physical Therapy  [place]           [x] Phone: [phone]   Fax: [phone]  [place] [place]           [] Phone: [phone]   Fax: [phone]        Physical Therapy Daily Treatment Note  Date:  2021    Patient Name:  Jean Mcmanus :  1945  MRN: [id number]  Restrictions/Precautions:   Prior bladder CA - caution with modalities   Diagnosis:   Multiple falls. OA involving multiple sites but not designated as generalized, spinal stenosis L region, neck pain  Date of Injury/Surgery:   Treatment Diagnosis:  Impaired balance, BLE weakness, neck pain    Insurance/Certification information: PT Insurance Information: Medicare   Referring Physician:  Referring Practitioner: REJI Shepherd  Next Doctor Visit:    Plan of care signed (Y/N):  yes   Outcome Measure: Jacobson/56     TU.34 seconds. 30 sec STS: 7 w/ unilateral UE assist     NDI:   Visit# / total visits:   Pain level: 5/10 - Neck pain  Goals:        Short term goals  Time Frame for Short term goals: 5 visits  Short term goal 1: Pt will be Ind with HEP in order to maximize recovery outside of clinic: Goal Remet 3/30  Long term goals  Time Frame for Long term goals : 10 visits  Long term goal 1: Pt will improve BLE strength to gross 4+/5 to aide in gait: GOAL ReMet 3/30  Long term goal 2: Pt will reduce TUG score to 12.5 seconds or less to show reduced fall risk: GOAL ReMet 3/30  Long term goal 3: Pt will improve Jacobson score to 50 or higher to reduce fall risk: Almost Met 3/30  Long term goal 4: Pt will improve 30 second STS score to 7 or more to show improved balance : GOAL ReMet 3/30  Long term goal 5: Pt will improve BL C spine Rot to 45 deg or higher to aide in ADLs: GOAL MET 3/30  Long term goal 6: Pt will improve BL SB L spine to within 50% normal motion to aide in ADLs: Almost MET 3/30    Summary of Evaluation: Assessment: Pt is a 49-year-old male who has worsening balance for years.  It has progressed to the point of 3 major falls within the last 3 months. Pt presents with impairments in balance, impaired gait, impaired BLE strength and overall reduced independence with safe ADL/IADL completion. Pt would benefit from skilled therapy interventions to address listed impairments, progress toward goal completion and improve ADL/IADL status. PT also warranted to reduce risk for future falls/injury or further decline. Subjective: Pt continuing to note improvement. States his balance is much improved. No recent falls of LOB. Any changes in Ambulatory Summary Sheet? None      Objective:    Prior to today's treatment session, patient was screened for signs and symptoms related to COVID-19 including but not limited to verbally answering questions related to feeling ill, cough, or SOB, along with taking temperature via forehead thermometer. Patient presented with all negative signs and symptoms and had no fever >100 degrees Fahrenheit this date. Jacobson/56  TU.34 seconds. 30 sec STS: 7 w/ unilateral UE assist     Strength RLE  R Hip Flexion: 4+/5  R Hip ABduction: 4+/5  R Hip ADduction: 5/5  R Knee Flexion: 4+/5  R Knee Extension: 4+/5     Strength LLE  L Hip Flexion: 4+/5  L Hip ABduction: 4+/5  L Hip ADduction: 5/5  L Knee Flexion: 4+/5  L Knee Extension: 4+/5     Lumbar AROM: flex: 50% ext: 25% BL SB: 40% BL Rot: 50%  C spine AROM: flex: WFL ext: WFL R SB: 26 deg, L SB: 28 deg. R Rot: 62 deg. L Rot: 65 deg.   BUE gross strength: 4+/5    Exercises: (No more than 4 columns)   Exercise/Equipment 3/23/21 #12 3/30/21 #13 21 #14           WARM UP         Nustep x7' L5 8' L5 10' L5         TABLE      Bridges  x 20     Clams      Seated marches      SLR x15 ea BLE 1#     SAQ      LAQ  1# x20 ea BLE 2# x20 ea BLE   Chin tucks X15, 5\"     UT stretch            PPT X20, 5\"       LTR      STANDING      STS  x 20 x10 10x   Standing hip abd  1# x10 ea BLE 2# x10 R/L   Standing marches  1# x10 ea BLE 2# x10 R/L   Stretch for putting on socks      Standing ham curls  1# x10 ea BLE 2# x10 R/L                    PROPRIOCEPTION      Static balance Foam: EO/EC romberg, EO/EC 1/2 tandem DELACRUZ    SLS      Step up and over      Foam beam      Dynamic standing  Tandem stance 20 sec R/L fwd 2x  Tandem stance 20 sec R/L fwd 2x      6in fwd step up 10x R/L   Cone tap   10x   Fwd crossovers    BUE support 1 lap   Rocker board      Delacruz & Tug                Other Therapeutic Activities/Education:  HEP and importance of completion. DC plans    Home Exercise Program:    1/7: STS, standing marches, clams, bridges  1/18: replaced bridge with SLR    Manual Treatments:       Modalities:  none    Communication with other providers:  POC sent    Assessment: 3/10 end pain. Pt tolerated today's treatment without any adverse reactions or complications this date. Continue x 2 more sessions. 3/30/21  Pt continues to demonstrate improvement with all aspects of care, both including original dx of balance and gait and recently with improvement in LBP and neck pain. Pt has met almost all goals at this time and has shown improvement in strength, ROM, balance and functional mobility independence. Pt to dc for HEP after 2 more visits. Pt would continue to benefit from skilled therapy interventions to address remaining impairments, improve mobility and strength and progress toward goal completion while reducing risk for re-injury or further decline.     Plan for Next Session:  PHILIPPE chairez strength, balance    Time In / Time Out: 0974-3972    Timed Code/Total Treatment Minutes: 42/42, (1) TE, (1) TA, (1) NR    Next Progress Note due:  Visit 18    Plan of Care Interventions:  [x] Therapeutic Exercise  [x] Modalities:  [x] Therapeutic Activity     [] Ultrasound  [x] Estim  [x] Gait Training      [] Cervical Traction [] Lumbar Traction  [x] Neuromuscular Re-education    [x] Cold/hotpack [] Iontophoresis   [x] Instruction in HEP      [x] Vasopneumatic [] Dry Needling    [x] Manual Therapy               [] Aquatic Therapy              Electronically signed by:  Wendy Sharpe PTA,   4/1/2021, 9:05 AM

## 2021-04-06 ENCOUNTER — HOSPITAL ENCOUNTER (OUTPATIENT)
Dept: PHYSICAL THERAPY | Age: 76
Setting detail: THERAPIES SERIES
Discharge: HOME | End: 2021-04-06
Payer: MEDICARE

## 2021-04-06 PROCEDURE — 97110 THERAPEUTIC EXERCISES: CPT

## 2021-04-06 PROCEDURE — 97112 NEUROMUSCULAR REEDUCATION: CPT

## 2021-04-06 NOTE — FLOWSHEET NOTE
Outpatient Physical Therapy  [place]           [x] Phone: [phone]   Fax: [phone]  [facility]           [] Phone: [phone]   Fax: [phone]        Physical Therapy Daily Treatment Note  Date:  2021    Patient Name:  Kayla Moore :  1945  MRN: [id number]  Restrictions/Precautions:   Prior bladder CA - caution with modalities   Diagnosis:   Multiple falls. OA involving multiple sites but not designated as generalized, spinal stenosis L region, neck pain  Date of Injury/Surgery:   Treatment Diagnosis:  Impaired balance, BLE weakness, neck pain    Insurance/Certification information: PT Insurance Information: Medicare   Referring Physician:  Referring Practitioner: REJI Fung  Next Doctor Visit:    Plan of care signed (Y/N):  yes   Outcome Measure: Jacobson/56     TU.34 seconds. 30 sec STS: 7 w/ unilateral UE assist     NDI:   Visit# / total visits: 15/20  Pain level: 5/10 - Neck pain  Goals:        Short term goals  Time Frame for Short term goals: 5 visits  Short term goal 1: Pt will be Ind with HEP in order to maximize recovery outside of clinic: Goal Remet 3/30  Long term goals  Time Frame for Long term goals : 10 visits  Long term goal 1: Pt will improve BLE strength to gross 4+/5 to aide in gait: GOAL ReMet 3/30  Long term goal 2: Pt will reduce TUG score to 12.5 seconds or less to show reduced fall risk: GOAL ReMet 3/30  Long term goal 3: Pt will improve Jacobson score to 50 or higher to reduce fall risk: Almost Met 3/30  Long term goal 4: Pt will improve 30 second STS score to 7 or more to show improved balance : GOAL ReMet 3/30  Long term goal 5: Pt will improve BL C spine Rot to 45 deg or higher to aide in ADLs: GOAL MET 3/30  Long term goal 6: Pt will improve BL SB L spine to within 50% normal motion to aide in ADLs: Almost MET 3/30    Summary of Evaluation: Assessment: Pt is a 49-year-old male who has worsening balance for years.  It has progressed to the point of 3 major falls within the last 3 months. Pt presents with impairments in balance, impaired gait, impaired BLE strength and overall reduced independence with safe ADL/IADL completion. Pt would benefit from skilled therapy interventions to address listed impairments, progress toward goal completion and improve ADL/IADL status. PT also warranted to reduce risk for future falls/injury or further decline. Subjective: Pt states he is doing well this date. He is not having any more pain than usual and agrees with dc plans for Thursday. Any changes in Ambulatory Summary Sheet? None      Objective:    Prior to today's treatment session, patient was screened for signs and symptoms related to COVID-19 including but not limited to verbally answering questions related to feeling ill, cough, or SOB, along with taking temperature via forehead thermometer. Patient presented with all negative signs and symptoms and had no fever >100 degrees Fahrenheit this date. Delacruz/56  TU.34 seconds. 30 sec STS: 7 w/ unilateral UE assist      Exercises: (No more than 4 columns)   Exercise/Equipment 3/30/21 #13 21 #14 21 #15           WARM UP         Nustep 8' L5 10' L5 L5 x10'          TABLE      Bridges   X15, 3\"   Clams      Seated marches      SLR   X10, 3# ea BLE   SAQ      LAQ 1# x20 ea BLE 2# x20 ea BLE X10, 3# ea BLE   Chin tucks      UT stretch            PPT   X15, 5\"     LTR      STANDING      STS x10 10x X10, minimal UE use   Standing hip abd 1# x10 ea BLE 2# x10 R/L X10, 3# ea BLE   Standing marches 1# x10 ea BLE 2# x10 R/L X10, 3# ea BLE   Stretch for putting on socks      Standing ham curls 1# x10 ea BLE 2# x10 R/L X10, 3# ea BLE                    PROPRIOCEPTION      Static balance DELACRUZ  Airex: romberg EO/EC, 1/2 tandem EO/EC.  SLS floor EO   SLS      Step up and over      Foam beam      Dynamic standing   Tandem stance 20 sec R/L fwd 2x Tandem  Forward EC     6in fwd step up 10x R/L    Cone tap  10x x10 ea w/ tall cone   Fwd crossovers   BUE support 1 lap    Rocker board      Jacobson & Tug                Other Therapeutic Activities/Education:  HEP and importance of completion. DC plans    Home Exercise Program:    1/7: STS, standing marches, clams, bridges  1/18: replaced bridge with SLR    Manual Treatments:       Modalities:  none    Communication with other providers:  POC sent    Assessment: 3/10 end pain. Pt tolerated today's treatment without any adverse reactions or complications this date. Pt demonstrated good tolerance to added weight for strength exercises, with minimal more rest given for fatigue. Pt would continue to benefit from skilled therapy interventions to address remaining impairments, improve mobility and strength and progress toward goal completion while reducing risk for re-injury or further decline.       Plan for Next Session:  PHILIPPE chairez strength, balance    Time In / Time Out: 2179-5503    Timed Code/Total Treatment Minutes: 44': 14' NMR x1, 25' TE x2    Next Progress Note due: dc    Plan of Care Interventions:  [x] Therapeutic Exercise  [x] Modalities:  [x] Therapeutic Activity     [] Ultrasound  [x] Estim  [x] Gait Training      [] Cervical Traction [] Lumbar Traction  [x] Neuromuscular Re-education    [x] Cold/hotpack [] Iontophoresis   [x] Instruction in HEP      [x] Vasopneumatic   [] Dry Needling    [x] Manual Therapy               [] Aquatic Therapy              Electronically signed by:  Melisa Jeong PT, DPT   4/6/2021, 6:42 AM

## 2021-04-08 ENCOUNTER — HOSPITAL ENCOUNTER (OUTPATIENT)
Dept: PHYSICAL THERAPY | Age: 76
Setting detail: THERAPIES SERIES
Discharge: HOME | End: 2021-04-08
Payer: MEDICARE

## 2021-04-08 PROCEDURE — 97110 THERAPEUTIC EXERCISES: CPT

## 2021-04-08 PROCEDURE — 97112 NEUROMUSCULAR REEDUCATION: CPT

## 2021-04-08 NOTE — DISCHARGE SUMMARY
Outpatient Physical Therapy           [place]           [x] Phone: [phone]   Fax: [phone]  [facility]           [] Phone: [phone]   Fax: [phone]      To: REJI Kevin    From: Oly Gamez, PT, DPT     Patient: Monroe Kumar                  : 1945  Diagnosis:     Multiple falls. OA involving multiple sites but not designated as generalized, neck pain, spinal stenosis of L region  Date: 2021  Treatment Diagnosis:   Impaired balance, BLE weakness         []  Progress Note              [x]  Discharge Note    Evaluation Date:  21  Total Visits to date: 12  Cancels/No-shows to date: 3    Subjective:  Pt states that he is feeling good this date. Pt states that since therapy start, he has improved overall ~70-80%. He stated that he has felt improvement in strength, endurance, reduced pain and improved ability to perform functional mobility tasks with less difficulty. He noticed that his balance has also improved, going up and down the stairs is easier and he has more energy at work and during his volunteering at [employer]. His neck and back pain has also reduced. Plan of Care/Treatment to date:  [x] Therapeutic Exercise    [x] Modalities:  [x] Therapeutic Activity     [] Ultrasound  [] Electrical Stimulation  [x] Gait Training      [] Cervical Traction   [] Lumbar Traction  [x] Neuromuscular Re-education  [x] Cold/hotpack [] Iontophoresis  [x] Instruction in HEP      Other:  [x] Manual Therapy       []  Vasopneumatic  [] Aquatic Therapy       []   Dry Needle Therapy                      Objective/Significant Findings At Last Visit/Comments:     Jacobson/56  TU.34 seconds.    30 sec STS: 7 w/ unilateral UE assist     Strength RLE  R Hip Flexion: 4+/5  R Hip ABduction: 4+/5  R Hip ADduction: 5/5  R Knee Flexion: 4+/5  R Knee Extension: 4+/5     Strength LLE  L Hip Flexion: 4+/5  L Hip ABduction: 4+/5  L Hip ADduction: 5/5  L Knee Flexion: 4+/5  L Knee Extension: 4+/5     Lumbar AROM: flex: 50% ext: 25% BL SB: 50% BL Rot: 50%  C spine AROM: flex: WFL ext: WFL R SB: 26 deg, L SB: 28 deg. R Rot: 62 deg. L Rot: 65 deg. BUE gross strength: 4+/5      Assessment: Pt has shown good progression since therapy start regarding improved ROM, improved strength, improved endurance, improved balance and overall improved functional mobility independence. Pt has met all goals at this time and is having minimal limitations with activity at home (only thing he reports he still cannot do is carry heavy objects up the stairs but all other tasks are now easier). PT educated pt on continuation of HEP after discharge for max benefits and reduced risk for future decline. Pt dc this date. Goal Status:  [x] Achieved [] Partially Achieved  [] Not Achieved     Changes to goals:    Short term goals  Time Frame for Short term goals: 5 visits  Short term goal 1: Pt will be Ind with HEP in order to maximize recovery outside of clinic: Goal Remet 4/8  Long term goals  Time Frame for Long term goals : 10 visits  Long term goal 1: Pt will improve BLE strength to gross 4+/5 to aide in gait: GOAL ReMet 4/8  Long term goal 2: Pt will reduce TUG score to 12.5 seconds or less to show reduced fall risk: GOAL ReMet 4/8  Long term goal 3: Pt will improve Jacobson score to 50 or higher to reduce fall risk: GOAL Met 4/8  Long term goal 4: Pt will improve 30 second STS score to 7 or more to show improved balance : GOAL ReMet 4/8  Long term goal 5: Pt will improve BL C spine Rot to 45 deg or higher to aide in ADLs: GOAL ReMET 4/8  Long term goal 6: Pt will improve BL SB L spine to within 50% normal motion to aide in ADLs: GOAL MET 4/8    Patient Status: [] Continue per initial plan of Care (since last PN/reeval)     [x] Patient now discharged     [] Additional visits requested, Please re-certify for additional visits:     If we are requesting more visits, we fully anticipate the patient's condition is expected

## 2021-04-08 NOTE — FLOWSHEET NOTE
Outpatient Physical Therapy  [place]           [x] Phone: [phone]   Fax: [phone]  [place]           [] Phone: [phone]   Fax: [phone]        Physical Therapy Daily Treatment Note  Date:  2021    Patient Name:  Maximus Bernal :  1945  MRN: [id number]  Restrictions/Precautions:   Prior bladder CA - caution with modalities   Diagnosis:   Multiple falls. OA involving multiple sites but not designated as generalized, spinal stenosis L region, neck pain  Date of Injury/Surgery:   Treatment Diagnosis:  Impaired balance, BLE weakness, neck pain    Insurance/Certification information: PT Insurance Information: Medicare   Referring Physician:  Referring Practitioner: REJI Daley  Next Doctor Visit:    Plan of care signed (Y/N):  yes   Outcome Measure: Jacobson/56     TU.34 seconds. 30 sec STS: 7 w/ unilateral UE assist     NDI:   Visit# / total visits:   Pain level: 3/10 - Neck pain  Goals:        Short term goals  Time Frame for Short term goals: 5 visits  Short term goal 1: Pt will be Ind with HEP in order to maximize recovery outside of clinic: Goal Remet   Long term goals  Time Frame for Long term goals : 10 visits  Long term goal 1: Pt will improve BLE strength to gross 4+/5 to aide in gait: GOAL ReMet   Long term goal 2: Pt will reduce TUG score to 12.5 seconds or less to show reduced fall risk: GOAL ReMet   Long term goal 3: Pt will improve Jacobson score to 50 or higher to reduce fall risk: GOAL Met   Long term goal 4: Pt will improve 30 second STS score to 7 or more to show improved balance : GOAL ReMet   Long term goal 5: Pt will improve BL C spine Rot to 45 deg or higher to aide in ADLs: GOAL ReMET   Long term goal 6: Pt will improve BL SB L spine to within 50% normal motion to aide in ADLs: GOAL MET     Summary of Evaluation: Assessment: Pt is a 47-year-old male who has worsening balance for years.  It has progressed WARM UP         Nustep 10' L5 L5 x10'  L5 x10'         TABLE      Bridges  X15, 3\" X15, 3\"   Clams      Seated marches      SLR  X10, 3# ea BLE x10 BLE 3# ea   SAQ      LAQ 2# x20 ea BLE X10, 3# ea BLE X10, 3# ea BLE   Chin tucks      UT stretch      PPT  X15, 5\" X10, 5\"     LTR      STANDING      STS 10x X10, minimal UE use x10   Standing hip abd 2# x10 R/L X10, 3# ea BLE X10, 3# ea BLE   Standing marches 2# x10 R/L X10, 3# ea BLE X10, 3# ea BLE   Stretch for putting on socks      Standing ham curls 2# x10 R/L X10, 3# ea BLE                     PROPRIOCEPTION      Static balance  Airex: romberg EO/EC, 1/2 tandem EO/EC. SLS floor EO    SLS      Step up and over      Foam beam      Dynamic standing  Tandem stance 20 sec R/L fwd 2x Tandem  Forward EC Tandem  Forward EC  Backwards EC  Sidestepping     6in fwd step up 10x R/L     Cone tap 10x x10 ea w/ tall cone    Fwd crossovers  BUE support 1 lap     Rocker board      Jacobson & Tug                Other Therapeutic Activities/Education:  HEP and importance of completion after dc    Home Exercise Program:    1/7: STS, standing marches, clams, bridges  1/18: replaced bridge with SLR    Manual Treatments:       Modalities:  none    Communication with other providers:  POC sent    Assessment: Pt tolerated today's treatment without any adverse reactions or complications this date. Pt has shown good progression since therapy start regarding improved ROM, improved strength, improved endurance, improved balance and overall improved functional mobility independence. Pt has met all goals at this time and is having minimal limitations with activity at home (only thing he reports he still cannot do is carry heavy objects up the stairs but all other tasks are now easier). PT educated pt on continuation of HEP after discharge for max benefits and reduced risk for future decline. Pt dc this date.   End pain: 2-3/10    Plan for Next Session:  nustep, BLE strength, balance    Time In / Time Out: 0900 - 0939    Timed Code/Total Treatment Minutes: 39': 14' NMR x1, 25' TE x2    Next Progress Note due: dc    Plan of Care Interventions:  [x] Therapeutic Exercise  [x] Modalities:  [x] Therapeutic Activity     [] Ultrasound  [x] Estim  [x] Gait Training      [] Cervical Traction [] Lumbar Traction  [x] Neuromuscular Re-education    [x] Cold/hotpack [] Iontophoresis   [x] Instruction in HEP      [x] Vasopneumatic   [] Dry Needling    [x] Manual Therapy               [] Aquatic Therapy              Electronically signed by:  Matthew Woods, PT, DPT  4/8/2021, 8:26 AM

## 2022-02-28 PROBLEM — N17.9 ACUTE RENAL FAILURE (HCC): Status: ACTIVE | Noted: 2022-02-28

## 2022-02-28 PROBLEM — I12.9 HYPERTENSIVE RENAL DISEASE: Status: ACTIVE | Noted: 2022-02-28

## 2022-02-28 PROBLEM — E11.21 DIABETIC NEPHROPATHY ASSOCIATED WITH TYPE 2 DIABETES MELLITUS (HCC): Status: ACTIVE | Noted: 2022-02-28

## 2022-02-28 PROBLEM — N18.30 STAGE 3 CHRONIC KIDNEY DISEASE (HCC): Status: ACTIVE | Noted: 2022-02-28

## 2022-03-29 PROBLEM — E87.5 HYPERKALEMIA: Status: ACTIVE | Noted: 2022-01-01

## 2022-12-24 ENCOUNTER — APPOINTMENT (OUTPATIENT)
Dept: CT IMAGING | Age: 77
DRG: 871 | End: 2022-12-24
Payer: MEDICARE

## 2022-12-24 ENCOUNTER — APPOINTMENT (OUTPATIENT)
Dept: GENERAL RADIOLOGY | Age: 77
DRG: 871 | End: 2022-12-24
Payer: MEDICARE

## 2022-12-24 ENCOUNTER — HOSPITAL ENCOUNTER (INPATIENT)
Age: 77
LOS: 2 days | DRG: 871 | End: 2022-12-26
Attending: EMERGENCY MEDICINE | Admitting: INTERNAL MEDICINE
Payer: MEDICARE

## 2022-12-24 DIAGNOSIS — R65.21 SEPTIC SHOCK (HCC): ICD-10-CM

## 2022-12-24 DIAGNOSIS — R41.82 ALTERED MENTAL STATUS, UNSPECIFIED ALTERED MENTAL STATUS TYPE: ICD-10-CM

## 2022-12-24 DIAGNOSIS — N39.0 URINARY TRACT INFECTION WITH HEMATURIA, SITE UNSPECIFIED: ICD-10-CM

## 2022-12-24 DIAGNOSIS — A41.9 SEPTICEMIA (HCC): Primary | ICD-10-CM

## 2022-12-24 DIAGNOSIS — A41.9 SEPTIC SHOCK (HCC): ICD-10-CM

## 2022-12-24 DIAGNOSIS — R31.9 URINARY TRACT INFECTION WITH HEMATURIA, SITE UNSPECIFIED: ICD-10-CM

## 2022-12-24 LAB
ALBUMIN SERPL-MCNC: 2.6 GM/DL (ref 3.4–5)
ALP BLD-CCNC: 107 IU/L (ref 40–129)
ALT SERPL-CCNC: 16 U/L (ref 10–40)
ANION GAP SERPL CALCULATED.3IONS-SCNC: 25 MMOL/L (ref 4–16)
ANISOCYTOSIS: ABNORMAL
APTT: 25 SECONDS (ref 25.1–37.1)
AST SERPL-CCNC: 30 IU/L (ref 15–37)
BACTERIA: NEGATIVE /HPF
BANDED NEUTROPHILS ABSOLUTE COUNT: 1.21 K/CU MM
BANDED NEUTROPHILS RELATIVE PERCENT: 3 % (ref 5–11)
BASE EXCESS: 12 (ref 0–3.3)
BILIRUB SERPL-MCNC: 0.4 MG/DL (ref 0–1)
BILIRUBIN URINE: NEGATIVE MG/DL
BLOOD, URINE: ABNORMAL
BUN BLDV-MCNC: 70 MG/DL (ref 6–23)
CALCIUM SERPL-MCNC: 8.6 MG/DL (ref 8.3–10.6)
CHLORIDE BLD-SCNC: 92 MMOL/L (ref 99–110)
CLARITY: ABNORMAL
CO2: 15 MMOL/L (ref 21–32)
COLOR: YELLOW
COMMENT: ABNORMAL
CREAT SERPL-MCNC: 5.5 MG/DL (ref 0.9–1.3)
DIFFERENTIAL TYPE: ABNORMAL
GFR SERPL CREATININE-BSD FRML MDRD: 10 ML/MIN/1.73M2
GLUCOSE BLD-MCNC: 139 MG/DL (ref 70–99)
GLUCOSE BLD-MCNC: 189 MG/DL
GLUCOSE BLD-MCNC: 189 MG/DL (ref 70–99)
GLUCOSE BLD-MCNC: 215 MG/DL (ref 70–99)
GLUCOSE, URINE: NEGATIVE MG/DL
HCO3 VENOUS: 14.1 MMOL/L (ref 19–25)
HCT VFR BLD CALC: 37.9 % (ref 42–52)
HEMOGLOBIN: 11.7 GM/DL (ref 13.5–18)
INR BLD: 0.98 INDEX
KETONES, URINE: NEGATIVE MG/DL
LACTATE: 5.8 MMOL/L (ref 0.5–1.9)
LEUKOCYTE ESTERASE, URINE: ABNORMAL
LIPASE: 85 IU/L (ref 13–60)
LYMPHOCYTES ABSOLUTE: 4.8 K/CU MM
LYMPHOCYTES RELATIVE PERCENT: 12 % (ref 24–44)
MAGNESIUM: 2.5 MG/DL (ref 1.8–2.4)
MCH RBC QN AUTO: 30.4 PG (ref 27–31)
MCHC RBC AUTO-ENTMCNC: 30.9 % (ref 32–36)
MCV RBC AUTO: 98.4 FL (ref 78–100)
MONOCYTES ABSOLUTE: 3.2 K/CU MM
MONOCYTES RELATIVE PERCENT: 8 % (ref 0–4)
NITRITE URINE, QUANTITATIVE: NEGATIVE
O2 SAT, VEN: 51.5 % (ref 50–70)
PCO2, VEN: 33 MMHG (ref 38–52)
PDW BLD-RTO: 17.1 % (ref 11.7–14.9)
PH VENOUS: 7.24 (ref 7.32–7.42)
PH, URINE: 6.5 (ref 5–8)
PLATELET # BLD: 311 K/CU MM (ref 140–440)
PLT MORPHOLOGY: ABNORMAL
PMV BLD AUTO: 10.1 FL (ref 7.5–11.1)
PO2, VEN: 33 MMHG (ref 28–48)
POLYCHROMASIA: ABNORMAL
POTASSIUM SERPL-SCNC: 4.6 MMOL/L (ref 3.5–5.1)
PROTEIN UA: >500 MG/DL
PROTHROMBIN TIME: 12.7 SECONDS (ref 11.7–14.5)
RAPID INFLUENZA  B AGN: NEGATIVE
RAPID INFLUENZA A AGN: NEGATIVE
RBC # BLD: 3.85 M/CU MM (ref 4.6–6.2)
RBC URINE: 823 /HPF (ref 0–3)
SARS-COV-2, NAAT: NOT DETECTED
SEGMENTED NEUTROPHILS ABSOLUTE COUNT: 31 K/CU MM
SEGMENTED NEUTROPHILS RELATIVE PERCENT: 77 % (ref 36–66)
SODIUM BLD-SCNC: 132 MMOL/L (ref 135–145)
SOURCE: NORMAL
SPECIFIC GRAVITY UA: 1.02 (ref 1–1.03)
TOTAL CK: 406 IU/L (ref 38–174)
TOTAL PROTEIN: 6.8 GM/DL (ref 6.4–8.2)
TRICHOMONAS: ABNORMAL /HPF
TSH HIGH SENSITIVITY: 6.08 UIU/ML (ref 0.27–4.2)
UROBILINOGEN, URINE: NORMAL MG/DL (ref 0.2–1)
WBC # BLD: 40.2 K/CU MM (ref 4–10.5)
WBC CLUMP: ABNORMAL /HPF
WBC UA: ABNORMAL /HPF (ref 0–2)

## 2022-12-24 PROCEDURE — 96361 HYDRATE IV INFUSION ADD-ON: CPT

## 2022-12-24 PROCEDURE — 2580000003 HC RX 258: Performed by: EMERGENCY MEDICINE

## 2022-12-24 PROCEDURE — 82805 BLOOD GASES W/O2 SATURATION: CPT

## 2022-12-24 PROCEDURE — 99285 EMERGENCY DEPT VISIT HI MDM: CPT

## 2022-12-24 PROCEDURE — 87186 SC STD MICRODIL/AGAR DIL: CPT

## 2022-12-24 PROCEDURE — 85610 PROTHROMBIN TIME: CPT

## 2022-12-24 PROCEDURE — 83605 ASSAY OF LACTIC ACID: CPT

## 2022-12-24 PROCEDURE — 87086 URINE CULTURE/COLONY COUNT: CPT

## 2022-12-24 PROCEDURE — 85730 THROMBOPLASTIN TIME PARTIAL: CPT

## 2022-12-24 PROCEDURE — 85007 BL SMEAR W/DIFF WBC COUNT: CPT

## 2022-12-24 PROCEDURE — 96366 THER/PROPH/DIAG IV INF ADDON: CPT

## 2022-12-24 PROCEDURE — 84443 ASSAY THYROID STIM HORMONE: CPT

## 2022-12-24 PROCEDURE — 87635 SARS-COV-2 COVID-19 AMP PRB: CPT

## 2022-12-24 PROCEDURE — 87324 CLOSTRIDIUM AG IA: CPT

## 2022-12-24 PROCEDURE — 87150 DNA/RNA AMPLIFIED PROBE: CPT

## 2022-12-24 PROCEDURE — 96360 HYDRATION IV INFUSION INIT: CPT

## 2022-12-24 PROCEDURE — 74176 CT ABD & PELVIS W/O CONTRAST: CPT

## 2022-12-24 PROCEDURE — 2500000003 HC RX 250 WO HCPCS: Performed by: INTERNAL MEDICINE

## 2022-12-24 PROCEDURE — 82962 GLUCOSE BLOOD TEST: CPT

## 2022-12-24 PROCEDURE — 96368 THER/DIAG CONCURRENT INF: CPT

## 2022-12-24 PROCEDURE — 83690 ASSAY OF LIPASE: CPT

## 2022-12-24 PROCEDURE — 87040 BLOOD CULTURE FOR BACTERIA: CPT

## 2022-12-24 PROCEDURE — 83735 ASSAY OF MAGNESIUM: CPT

## 2022-12-24 PROCEDURE — 81001 URINALYSIS AUTO W/SCOPE: CPT

## 2022-12-24 PROCEDURE — 71045 X-RAY EXAM CHEST 1 VIEW: CPT

## 2022-12-24 PROCEDURE — 80053 COMPREHEN METABOLIC PANEL: CPT

## 2022-12-24 PROCEDURE — 82550 ASSAY OF CK (CPK): CPT

## 2022-12-24 PROCEDURE — 93005 ELECTROCARDIOGRAM TRACING: CPT | Performed by: EMERGENCY MEDICINE

## 2022-12-24 PROCEDURE — 36556 INSERT NON-TUNNEL CV CATH: CPT

## 2022-12-24 PROCEDURE — 87449 NOS EACH ORGANISM AG IA: CPT

## 2022-12-24 PROCEDURE — 87804 INFLUENZA ASSAY W/OPTIC: CPT

## 2022-12-24 PROCEDURE — 96375 TX/PRO/DX INJ NEW DRUG ADDON: CPT

## 2022-12-24 PROCEDURE — 2500000003 HC RX 250 WO HCPCS: Performed by: EMERGENCY MEDICINE

## 2022-12-24 PROCEDURE — 51702 INSERT TEMP BLADDER CATH: CPT

## 2022-12-24 PROCEDURE — 6370000000 HC RX 637 (ALT 250 FOR IP): Performed by: EMERGENCY MEDICINE

## 2022-12-24 PROCEDURE — 85027 COMPLETE CBC AUTOMATED: CPT

## 2022-12-24 PROCEDURE — 2700000000 HC OXYGEN THERAPY PER DAY

## 2022-12-24 PROCEDURE — 6360000002 HC RX W HCPCS: Performed by: EMERGENCY MEDICINE

## 2022-12-24 PROCEDURE — 2580000003 HC RX 258: Performed by: INTERNAL MEDICINE

## 2022-12-24 PROCEDURE — 02HV33Z INSERTION OF INFUSION DEVICE INTO SUPERIOR VENA CAVA, PERCUTANEOUS APPROACH: ICD-10-PCS | Performed by: INTERNAL MEDICINE

## 2022-12-24 PROCEDURE — 96365 THER/PROPH/DIAG IV INF INIT: CPT

## 2022-12-24 PROCEDURE — 2000000000 HC ICU R&B

## 2022-12-24 PROCEDURE — 70450 CT HEAD/BRAIN W/O DYE: CPT

## 2022-12-24 PROCEDURE — 94640 AIRWAY INHALATION TREATMENT: CPT

## 2022-12-24 RX ORDER — ONDANSETRON 4 MG/1
4 TABLET, ORALLY DISINTEGRATING ORAL EVERY 8 HOURS PRN
Status: DISCONTINUED | OUTPATIENT
Start: 2022-12-24 | End: 2022-12-26 | Stop reason: HOSPADM

## 2022-12-24 RX ORDER — METRONIDAZOLE 500 MG/100ML
500 INJECTION, SOLUTION INTRAVENOUS EVERY 8 HOURS
Status: DISCONTINUED | OUTPATIENT
Start: 2022-12-24 | End: 2022-12-26 | Stop reason: HOSPADM

## 2022-12-24 RX ORDER — 0.9 % SODIUM CHLORIDE 0.9 %
1000 INTRAVENOUS SOLUTION INTRAVENOUS ONCE
Status: COMPLETED | OUTPATIENT
Start: 2022-12-24 | End: 2022-12-24

## 2022-12-24 RX ORDER — SODIUM CHLORIDE 0.9 % (FLUSH) 0.9 %
5-40 SYRINGE (ML) INJECTION EVERY 12 HOURS SCHEDULED
Status: DISCONTINUED | OUTPATIENT
Start: 2022-12-24 | End: 2022-12-26 | Stop reason: HOSPADM

## 2022-12-24 RX ORDER — NOREPINEPHRINE BIT/0.9 % NACL 16MG/250ML
2-100 INFUSION BOTTLE (ML) INTRAVENOUS CONTINUOUS
Status: DISCONTINUED | OUTPATIENT
Start: 2022-12-24 | End: 2022-12-26 | Stop reason: HOSPADM

## 2022-12-24 RX ORDER — ALBUTEROL SULFATE 90 UG/1
2 AEROSOL, METERED RESPIRATORY (INHALATION) ONCE
Status: COMPLETED | OUTPATIENT
Start: 2022-12-24 | End: 2022-12-24

## 2022-12-24 RX ORDER — SODIUM CHLORIDE 0.9 % (FLUSH) 0.9 %
5-40 SYRINGE (ML) INJECTION PRN
Status: DISCONTINUED | OUTPATIENT
Start: 2022-12-24 | End: 2022-12-26 | Stop reason: HOSPADM

## 2022-12-24 RX ORDER — SODIUM CHLORIDE, SODIUM LACTATE, POTASSIUM CHLORIDE, CALCIUM CHLORIDE 600; 310; 30; 20 MG/100ML; MG/100ML; MG/100ML; MG/100ML
INJECTION, SOLUTION INTRAVENOUS CONTINUOUS
Status: DISCONTINUED | OUTPATIENT
Start: 2022-12-24 | End: 2022-12-24

## 2022-12-24 RX ORDER — INSULIN LISPRO 100 [IU]/ML
0-8 INJECTION, SOLUTION INTRAVENOUS; SUBCUTANEOUS
Status: DISCONTINUED | OUTPATIENT
Start: 2022-12-25 | End: 2022-12-26 | Stop reason: HOSPADM

## 2022-12-24 RX ORDER — SODIUM CHLORIDE 9 MG/ML
INJECTION, SOLUTION INTRAVENOUS PRN
Status: DISCONTINUED | OUTPATIENT
Start: 2022-12-24 | End: 2022-12-26 | Stop reason: HOSPADM

## 2022-12-24 RX ORDER — INSULIN LISPRO 100 [IU]/ML
0-4 INJECTION, SOLUTION INTRAVENOUS; SUBCUTANEOUS NIGHTLY
Status: DISCONTINUED | OUTPATIENT
Start: 2022-12-24 | End: 2022-12-26 | Stop reason: HOSPADM

## 2022-12-24 RX ORDER — ACETAMINOPHEN 325 MG/1
650 TABLET ORAL EVERY 6 HOURS PRN
Status: DISCONTINUED | OUTPATIENT
Start: 2022-12-24 | End: 2022-12-26 | Stop reason: HOSPADM

## 2022-12-24 RX ORDER — NOREPINEPHRINE BIT/0.9 % NACL 16MG/250ML
1-100 INFUSION BOTTLE (ML) INTRAVENOUS CONTINUOUS
Status: DISCONTINUED | OUTPATIENT
Start: 2022-12-24 | End: 2022-12-24 | Stop reason: SDUPTHER

## 2022-12-24 RX ORDER — ONDANSETRON 2 MG/ML
4 INJECTION INTRAMUSCULAR; INTRAVENOUS EVERY 6 HOURS PRN
Status: DISCONTINUED | OUTPATIENT
Start: 2022-12-24 | End: 2022-12-26 | Stop reason: HOSPADM

## 2022-12-24 RX ORDER — VANCOMYCIN HYDROCHLORIDE 125 MG/1
250 CAPSULE ORAL EVERY 6 HOURS
Status: DISCONTINUED | OUTPATIENT
Start: 2022-12-24 | End: 2022-12-25

## 2022-12-24 RX ADMIN — METRONIDAZOLE 500 MG: 500 INJECTION, SOLUTION INTRAVENOUS at 20:18

## 2022-12-24 RX ADMIN — SODIUM CHLORIDE 1000 ML: 9 INJECTION, SOLUTION INTRAVENOUS at 16:27

## 2022-12-24 RX ADMIN — Medication 10 MCG/MIN: at 21:12

## 2022-12-24 RX ADMIN — Medication 5 MCG/MIN: at 19:25

## 2022-12-24 RX ADMIN — VANCOMYCIN HYDROCHLORIDE 2000 MG: 1 INJECTION, POWDER, LYOPHILIZED, FOR SOLUTION INTRAVENOUS at 17:03

## 2022-12-24 RX ADMIN — SODIUM CHLORIDE 1000 ML: 9 INJECTION, SOLUTION INTRAVENOUS at 16:57

## 2022-12-24 RX ADMIN — Medication 2 PUFF: at 16:30

## 2022-12-24 RX ADMIN — SODIUM BICARBONATE: 84 INJECTION, SOLUTION INTRAVENOUS at 22:24

## 2022-12-24 RX ADMIN — SODIUM CHLORIDE, PRESERVATIVE FREE 10 ML: 5 INJECTION INTRAVENOUS at 22:00

## 2022-12-24 RX ADMIN — SODIUM CHLORIDE 1000 ML: 9 INJECTION, SOLUTION INTRAVENOUS at 16:28

## 2022-12-24 RX ADMIN — ALBUTEROL SULFATE 2 PUFF: 90 AEROSOL, METERED RESPIRATORY (INHALATION) at 16:29

## 2022-12-24 RX ADMIN — MEROPENEM 1000 MG: 1 INJECTION, POWDER, FOR SOLUTION INTRAVENOUS at 16:50

## 2022-12-24 RX ADMIN — VASOPRESSIN 0.03 UNITS/MIN: 0.2 INJECTION INTRAVENOUS at 21:31

## 2022-12-24 ASSESSMENT — LIFESTYLE VARIABLES
HOW MANY STANDARD DRINKS CONTAINING ALCOHOL DO YOU HAVE ON A TYPICAL DAY: PATIENT DOES NOT DRINK
HOW OFTEN DO YOU HAVE A DRINK CONTAINING ALCOHOL: NEVER

## 2022-12-24 ASSESSMENT — ENCOUNTER SYMPTOMS: SHORTNESS OF BREATH: 1

## 2022-12-24 NOTE — ED PROVIDER NOTES
[facility]      TRIAGE CHIEF COMPLAINT:   Other (unresponsive)      [place]:  Lai Adjutant is a 76 y.o. male that presents by EMS from nursing home with complaint of altered mental status, unresponsive. Patient apparently has possibly passed out twice today. Brought in by EMS blood sugar normal.  Full code initially per EMS. On arrival patient is awake he is not talking mouth is open, did have some food in his mouth I did remove this with Elvira forceps on arrival.  He is not following commands he does withdraw to pain he is tachycardic he is hypertensive he is awake but not otherwise responding is on nasal cannula, facemask. No further HPI available. Does have a Washington catheter in does appear to be purulent. Work-up initiated    REVIEW OF SYSTEMS:      Review of Systems   Constitutional:  Positive for activity change and fatigue. Respiratory:  Positive for shortness of breath. Neurological:  Positive for syncope and weakness. Psychiatric/Behavioral:  Positive for confusion.       Past Medical History:   Diagnosis Date    Arthritis     Cancer ([facility], [address]     hx bladder cancer- dx 2009- following with Dr Samantha Metcalf    Chronic back pain     \"on Tramadol- had back surgery in the past\"    Diabetes mellitus ([facility] [address]     [id number]    History of blood transfusion     \"in 2008\"    History of kidney stones     History of motion sickness     Hyperlipidemia     Hypertension     PONV (postoperative nausea and vomiting)     Sleep apnea     sleep study 2014-\"could not tolerate the cpap machine\"     Past Surgical History:   Procedure Laterality Date    BACK SURGERY      per old chart lumbar back surgery done 2006    COLONOSCOPY  2006    CYSTOSCOPY  1993    kidney stone removal    CYSTOSCOPY  2014    bladder biopsy    CYSTOSCOPY  07/12/2016    EYE SURGERY  2010    [facility] cataract ext    JOINT REPLACEMENT      per old chart total right knee in 2002, total left knee 2008 and revision of left knee in 2009    KNEE SURGERY Right 1989    scope    LITHOTRIPSY  2010(per old chart)    SHOULDER SURGERY      per old chart rot cuff repair right shoulder in . left shoulder done     VARICOSE VEIN SURGERY      per old chart [facility] leg vein stripping done , had some more taken care of left leg since then 2014- after the last surg- got cellulitis left foot      Family History   Problem Relation Age of Onset    Heart Disease Mother         chf    Diabetes Father      Social History     Socioeconomic History    Marital status:      Spouse name: Not on file    Number of children: Not on file    Years of education: Not on file    Highest education level: Not on file   Occupational History    Not on file   Tobacco Use    Smoking status: Former     Packs/day: 3.00     Years: 15.00     Pack years: 45.00     Types: Cigarettes     Quit date: 1985     Years since quittin.9    Smokeless tobacco: Never   Substance and Sexual Activity    Alcohol use: No    Drug use: No    Sexual activity: Not on file   Other Topics Concern    Not on file   Social History Narrative    Not on file     Social Determinants of Health     Financial Resource Strain: Not on file   Food Insecurity: Not on file   Transportation Needs: Not on file   Physical Activity: Not on file   Stress: Not on file   Social Connections: Not on file   Intimate Partner Violence: Not on file   Housing Stability: Not on file     Current Facility-Administered Medications   Medication Dose Route Frequency Provider Last Rate Last Admin    norepinephrine (LEVOPHED) 16 mg in sodium chloride 0.9 % 250 mL infusion  1-100 mcg/min IntraVENous Continuous Kurt Rodríguez DO 9.4 mL/hr at 22 10 mcg/min at 228     Current Outpatient Medications   Medication Sig Dispense Refill    acetaminophen (TYLENOL) 500 MG tablet Take 500 mg by mouth every 6 hours as needed for Pain      metoprolol succinate (TOPROL XL) 200 MG extended release tablet Take 1 tablet by mouth daily 30 tablet 2    atorvastatin (LIPITOR) 40 MG tablet Take 40 mg by mouth daily      gabapentin (NEURONTIN) 100 MG capsule Take 100 mg by mouth nightly Take 1- 3 tabs at night      metFORMIN (GLUCOPHAGE) 1000 MG tablet Take 1,000 mg by mouth 2 times daily (with meals)      traMADol (ULTRAM) 50 MG tablet Take 50 mg by mouth every 8 hours as needed for Pain      Cyanocobalamin (VITAMIN B 12 PO) Take 1,000 mg by mouth daily      Pyridoxine HCl (VITAMIN B-6) 50 MG tablet Take 50 mg by mouth daily        Allergies   Allergen Reactions    Oxycontin [Oxycodone Hcl]      hallucinations    Penicillins Hives     Current Facility-Administered Medications   Medication Dose Route Frequency Provider Last Rate Last Admin    norepinephrine (LEVOPHED) 16 mg in sodium chloride 0.9 % 250 mL infusion  1-100 mcg/min IntraVENous Continuous Geno Arnt, DO 9.4 mL/hr at 12/24/22 1928 10 mcg/min at 12/24/22 1928     Current Outpatient Medications   Medication Sig Dispense Refill    acetaminophen (TYLENOL) 500 MG tablet Take 500 mg by mouth every 6 hours as needed for Pain      metoprolol succinate (TOPROL XL) 200 MG extended release tablet Take 1 tablet by mouth daily 30 tablet 2    atorvastatin (LIPITOR) 40 MG tablet Take 40 mg by mouth daily      gabapentin (NEURONTIN) 100 MG capsule Take 100 mg by mouth nightly Take 1- 3 tabs at night      metFORMIN (GLUCOPHAGE) 1000 MG tablet Take 1,000 mg by mouth 2 times daily (with meals)      traMADol (ULTRAM) 50 MG tablet Take 50 mg by mouth every 8 hours as needed for Pain      Cyanocobalamin (VITAMIN B 12 PO) Take 1,000 mg by mouth daily      Pyridoxine HCl (VITAMIN B-6) 50 MG tablet Take 50 mg by mouth daily         Nursing Notes Reviewed    VITAL SIGNS:  ED Triage Vitals   Enc Vitals Group      BP       Pulse       Resp       Temp       Temp src       SpO2       Weight       Height       Head Circumference       Peak Flow       Pain Score       Pain Loc       Pain Edu? Excl. in GC?        PHYSICAL EXAM:  Physical Exam  Constitutional:       General: He is not in acute distress. Appearance: He is well-developed. He is ill-appearing. He is not toxic-appearing or diaphoretic. Interventions: Nasal cannula in place. HENT:      Head: Normocephalic and atraumatic. Right Ear: External ear normal.      Left Ear: External ear normal.   Eyes:      General: No scleral icterus. Right eye: No discharge. Left eye: No discharge. Extraocular Movements: Extraocular movements intact. Conjunctiva/sclera: Conjunctivae normal.      Pupils: Pupils are equal, round, and reactive to light. Cardiovascular:      Rate and Rhythm: Regular rhythm. Tachycardia present. Pulses: Normal pulses. Heart sounds: Normal heart sounds. Pulmonary:      Effort: Pulmonary effort is normal.      Breath sounds: Rhonchi present. Abdominal:      General: Bowel sounds are normal. There is no distension. There are no signs of injury. Palpations: Abdomen is soft. There is no mass or pulsatile mass. Tenderness: There is no abdominal tenderness. There is no guarding or rebound. Negative signs include Miranda's sign, Rovsing's sign and McBurney's sign. Hernia: No hernia is present. Musculoskeletal:         General: No swelling, tenderness, deformity or signs of injury. Right lower leg: No edema. Skin:     General: Skin is warm. Coloration: Skin is pale. Skin is not jaundiced. Findings: No bruising, erythema, lesion or rash. Neurological:      Mental Status: He is alert. He is disoriented and confused. GCS: GCS eye subscore is 4. GCS verbal subscore is 1. GCS motor subscore is 4. Cranial Nerves: Dysarthria present. Motor: Weakness present. No tremor or abnormal muscle tone.       Comments: Aphasia, ams, general overall weakness         I have reviewed andinterpreted all of the currently available lab results from this visit (if applicable):    Results for orders placed or performed during the hospital encounter of 12/24/22   COVID-19, Rapid    Specimen: Nasopharyngeal   Result Value Ref Range    Source UNKNOWN     SARS-CoV-2, NAAT NOT DETECTED NOT DETECTED   Rapid Flu Swab    Specimen: Nasopharyngeal   Result Value Ref Range    Rapid Influenza A Ag NEGATIVE NEGATIVE    Rapid Influenza B Ag NEGATIVE NEGATIVE   CBC with Auto Differential   Result Value Ref Range    WBC 40.2 (HH) 4.0 - 10.5 K/CU MM    RBC 3.85 (L) 4.6 - 6.2 M/CU MM    Hemoglobin 11.7 (L) 13.5 - 18.0 GM/DL    Hematocrit 37.9 (L) 42 - 52 %    MCV 98.4 78 - 100 FL    MCH 30.4 27 - 31 PG    MCHC 30.9 (L) 32.0 - 36.0 %    RDW 17.1 (H) 11.7 - 14.9 %    Platelets 190 843 - 294 K/CU MM    MPV 10.1 7.5 - 11.1 FL    Bands Relative 3 (L) 5 - 11 %    Segs Relative 77.0 (H) 36 - 66 %    Lymphocytes % 12.0 (L) 24 - 44 %    Monocytes % 8.0 (H) 0 - 4 %    Bands Absolute 1.21 K/CU MM    Segs Absolute 31.0 K/CU MM    Lymphocytes Absolute 4.8 K/CU MM    Monocytes Absolute 3.2 K/CU MM    Differential Type MANUAL DIFFERENTIAL     Anisocytosis 1+     Polychromasia 1+     PLT Morphology FEW LARGE PLATELETS    Comprehensive Metabolic Panel   Result Value Ref Range    Sodium 132 (L) 135 - 145 MMOL/L    Potassium 4.6 3.5 - 5.1 MMOL/L    Chloride 92 (L) 99 - 110 mMol/L    CO2 15 (L) 21 - 32 MMOL/L    BUN 70 (H) 6 - 23 MG/DL    Creatinine 5.5 (H) 0.9 - 1.3 MG/DL    Est, Glom Filt Rate 10 (L) >60 mL/min/1.73m2    Glucose 139 (H) 70 - 99 MG/DL    Calcium 8.6 8.3 - 10.6 MG/DL    Albumin 2.6 (L) 3.4 - 5.0 GM/DL    Total Protein 6.8 6.4 - 8.2 GM/DL    Total Bilirubin 0.4 0.0 - 1.0 MG/DL    ALT 16 10 - 40 U/L    AST 30 15 - 37 IU/L    Alkaline Phosphatase 107 40 - 129 IU/L    Anion Gap 25 (H) 4 - 16   Lipase   Result Value Ref Range    Lipase 85 (H) 13 - 60 IU/L   Lactic Acid   Result Value Ref Range    Lactate 5.8 (HH) 0.5 - 1.9 mMOL/L   Protime/INR & PTT   Result Value Ref Range    Protime 12.7 11.7 - 14.5 SECONDS    INR 0.98 INDEX    aPTT 25.0 (L) 25.1 - 37.1 SECONDS   Urinalysis   Result Value Ref Range    Color, UA YELLOW YELLOW    Clarity, UA TURBID (A) CLEAR    Glucose, Urine NEGATIVE NEGATIVE MG/DL    Bilirubin Urine NEGATIVE NEGATIVE MG/DL    Ketones, Urine NEGATIVE NEGATIVE MG/DL    Specific Gravity, UA 1.020 1.001 - 1.035    Blood, Urine LARGE (A) NEGATIVE    pH, Urine 6.5 5.0 - 8.0    Protein, UA >500 (HH) NEGATIVE MG/DL    Urobilinogen, Urine NORMAL 0.2 - 1.0 MG/DL    Nitrite Urine, Quantitative NEGATIVE NEGATIVE    Leukocyte Esterase, Urine LARGE (A) NEGATIVE   Blood Gas, Venous   Result Value Ref Range    pH, Fabien 7.24 (L) 7.32 - 7.42    pCO2, Fabien 33 (L) 38 - 52 mmHG    pO2, Fabien 33 28 - 48 mmHG    Base Excess 12 (H) 0 - 3.3    HCO3, Venous 14.1 (L) 19 - 25 MMOL/L    O2 Sat, Fabien 51.5 50 - 70 %    Comment VBG    CK   Result Value Ref Range    Total  (H) 38 - 174 IU/L   Magnesium   Result Value Ref Range    Magnesium 2.5 (H) 1.8 - 2.4 mg/dl   TSH   Result Value Ref Range    TSH, High Sensitivity 6.080 (H) 0.270 - 4.20 uIu/ml   Microscopic Urinalysis   Result Value Ref Range    RBC,  (H) 0 - 3 /HPF    WBC, UA 89646 (H) 0 - 2 /HPF    Bacteria, UA NEGATIVE NEGATIVE /HPF    WBC Clumps, UA MANY /HPF    Trichomonas, UA NONE SEEN NONE SEEN /HPF   POC Blood Glucose   Result Value Ref Range    Glucose 189 mg/dL   POCT Glucose   Result Value Ref Range    POC Glucose 189 (H) 70 - 99 MG/DL        Radiographs (if obtained):  [] The following radiograph was interpreted by myself in the absence of a radiologist:  [x] Radiologist's Report Reviewed:    CT Brain, CXR, CT Abd/pelv    EKG (if obtained): (All EKG's are interpreted by myself in the absence of a cardiologist)    12 lead EKG per my interpretation:  Sinus Tachycardia 122  Axis is   Normal  QTc is   436  There is no specific T wave changes appreciated. There is no specific ST wave changes appreciated.     Prior EKG to compare with was not available SIRS CRITERIA: (2 required)  Temperature <36 or >38  No  Heart rate >90    Yes  RR >20 or PCO2 <32   Yes  WBC >12 or <4 or >10% bands Yes    SEPSIS CRITERIA: (Both required)  Two or more of the above  Yes  Suspected source(s) of infection Urine/lung/abdomen    ANTIBIOTIC SELECTION RATIONAL  Vancomycin, Meropenem    ANTIBIOTIC SELECTION LIMITATIONS  Allergy    LACTIC ACID    Initial     See documentation  Follow - up if initial abnormal  See documentation    SEPTIC SHOCK CRITERIA:  SBP <90 or 40 reduction from baseline refractory to fluid resuscitation:  Yes  Serum Lactic Acid >4:   see documentation    FLUIDS  Saline 30 ml/kg given (over 30-60 min) No  (Septic SHOCK or lactic > 4)    FLUID ADMINISTRATION BARRIERS  Poor IV access and Significant active pulmonary edema or CHF     OTHER TREATMENT BARRIERS  None    CENTRAL LINE INSERTED? yes    Judy Gayle DO    Procedure Note - Central Line:  Emergent central line placed due to need for access, administration of pressors, fluids, antibiotics, septic shock Andrew Cedeño was prepped and draped in standard bedside fashion in the right femoral area. Physical landmarks with ultrasound guidance was used to locate the central vein. Local anesthesia with  0 ml of None was injected. Seldinger technique was used for placement of the CVC in a non-pulsatile vasculature. All ports simón and flushed. The patient tolerated the procedure well and no acute complications occurred. Total critical care time today provided was at least 60 minutes. This excludes seperately billable procedure. Critical care time provided for reviewing labs, images giving oxygen, antibiotics, breathing treatments, pressors for septic shock consulting medicine that required close evaluation and/or intervention with concern for patient decompensation. MDM:    Patient brought in by EMS with complaint of altered mental status, unresponsiveness.   Patient apparently full code per EMS brought by nursing home blood sugar normal.  Apparently he is passed out twice today. Patient on arrival is staring off into space his mouth is open he does have a piece of food in his mouth I did remove this with Elvira forceps on arrival.  He is on a nonrebreather, nasal cannula. He does withdraw to pain vital signs otherwise are stable except for tachycardia and hypotensive. He has coarse breath sounds bilaterally. Initially no family or staff present work-up initiated. Patient protecting airway. Patient had a blood pressure 50 on arrival I did emergently put a central line in his right groin due to need for access code administration of fluids, antibiotics possibly pressors. He got IV fluids at least 3 L I gave him broad-spectrum to biotics. Has allergy to penicillins did use meropenem and vancomycin. Patient did have improvement of blood pressure ICU physician came down and saw him shortly arrival we will try admit to stepdown if possible but if not go to ICU ICU has currently been near full. Patient again is protecting airway did do labs, imaging does appear to have a purulent Washington catheter likely UTI, septic shock. This did come out to be true. Again he was given fluids antibiotics will admit to hospital medicine, ICU as he did require pressors CT brain and chest x-ray otherwise stable does have a white count of 40 lactic acid 5 patient critically ill will be admitted. Patient again did require pressors I did talk to ICU physician. Will need ICU. Patient critically ill admitted. CLINICAL IMPRESSION:  Final diagnoses:    Altered mental status, unspecified altered mental status type   Urinary tract infection with hematuria, site unspecified   Septicemia ([address]   Septic shock ([address]       (Please note that portions of this note may have been completed with a voice recognition program. Efforts were made to edit the dictations but occasionally words aremis-transcribed.)    DISPOSITION REFERRAL (if applicable):  No follow-up provider specified.     DISPOSITION MEDICATIONS (if applicable):  New Prescriptions    No medications on file          Teresa Hall, [address] [facility], DO  12/24/22 [address] [facility], DO  12/24/22 [id number]

## 2022-12-25 ENCOUNTER — APPOINTMENT (OUTPATIENT)
Dept: GENERAL RADIOLOGY | Age: 77
DRG: 871 | End: 2022-12-25
Payer: MEDICARE

## 2022-12-25 LAB
ADENOVIRUS F 40 41 PCR: NOT DETECTED
ALBUMIN SERPL-MCNC: 2.1 GM/DL (ref 3.4–5)
ALP BLD-CCNC: 94 IU/L (ref 40–129)
ALT SERPL-CCNC: 19 U/L (ref 10–40)
ANION GAP SERPL CALCULATED.3IONS-SCNC: 25 MMOL/L (ref 4–16)
ANION GAP SERPL CALCULATED.3IONS-SCNC: 26 MMOL/L (ref 4–16)
AST SERPL-CCNC: 32 IU/L (ref 15–37)
ASTROVIRUS PCR: NOT DETECTED
BANDED NEUTROPHILS ABSOLUTE COUNT: 12.36 K/CU MM
BANDED NEUTROPHILS RELATIVE PERCENT: 22 % (ref 5–11)
BILIRUB SERPL-MCNC: 0.2 MG/DL (ref 0–1)
BUN BLDV-MCNC: 65 MG/DL (ref 6–23)
BUN BLDV-MCNC: 66 MG/DL (ref 6–23)
C DIFF AG + TOXIN: ABNORMAL
CALCIUM SERPL-MCNC: 6.8 MG/DL (ref 8.3–10.6)
CALCIUM SERPL-MCNC: 7.2 MG/DL (ref 8.3–10.6)
CAMPYLOBACTER PCR: NOT DETECTED
CHLORIDE BLD-SCNC: 95 MMOL/L (ref 99–110)
CHLORIDE BLD-SCNC: 96 MMOL/L (ref 99–110)
CO2: 14 MMOL/L (ref 21–32)
CO2: 15 MMOL/L (ref 21–32)
CREAT SERPL-MCNC: 4.6 MG/DL (ref 0.9–1.3)
CREAT SERPL-MCNC: 5.2 MG/DL (ref 0.9–1.3)
CRYPTOSPORIDIUM PCR: NOT DETECTED
CULTURE: NORMAL
CYCLOSPORA CAYETANENSIS PCR: NOT DETECTED
DIFFERENTIAL TYPE: ABNORMAL
E COLI 0157 PCR: NOT DETECTED
E COLI ENTEROAGGREGATIVE PCR: NOT DETECTED
E COLI ENTEROPATHOGENIC PCR: NOT DETECTED
E COLI ENTEROTOXIGENIC PCR: NOT DETECTED
E COLI SHIGA LIKE TOXIN PCR: ABNORMAL
E COLI SHIGELLA/ENTEROINVASIVE PCR: NOT DETECTED
ENTAMOEBA HISTOLYTICA PCR: NOT DETECTED
GFR SERPL CREATININE-BSD FRML MDRD: 11 ML/MIN/1.73M2
GFR SERPL CREATININE-BSD FRML MDRD: 12 ML/MIN/1.73M2
GIARDIA LAMBLIA PCR: NOT DETECTED
GLUCOSE BLD-MCNC: 150 MG/DL (ref 70–99)
GLUCOSE BLD-MCNC: 177 MG/DL (ref 70–99)
GLUCOSE BLD-MCNC: 180 MG/DL (ref 70–99)
GLUCOSE BLD-MCNC: 185 MG/DL (ref 70–99)
GLUCOSE BLD-MCNC: 186 MG/DL (ref 70–99)
GLUCOSE BLD-MCNC: 203 MG/DL (ref 70–99)
HCT VFR BLD CALC: 37.6 % (ref 42–52)
HEMOGLOBIN: 11.9 GM/DL (ref 13.5–18)
LACTATE: 2.3 MMOL/L (ref 0.5–1.9)
LACTATE: 2.9 MMOL/L (ref 0.5–1.9)
LYMPHOCYTES ABSOLUTE: 6.2 K/CU MM
LYMPHOCYTES RELATIVE PERCENT: 11 % (ref 24–44)
Lab: NORMAL
MCH RBC QN AUTO: 30.7 PG (ref 27–31)
MCHC RBC AUTO-ENTMCNC: 31.6 % (ref 32–36)
MCV RBC AUTO: 96.9 FL (ref 78–100)
METAMYELOCYTES ABSOLUTE COUNT: 1.69 K/CU MM
METAMYELOCYTES PERCENT: 3 %
MONOCYTES ABSOLUTE: 6.7 K/CU MM
MONOCYTES RELATIVE PERCENT: 12 % (ref 0–4)
MYELOCYTE PERCENT: 1 %
MYELOCYTES ABSOLUTE COUNT: 0.56 K/CU MM
NOROVIRUS GI GII PCR: NOT DETECTED
PDW BLD-RTO: 16.9 % (ref 11.7–14.9)
PLATELET # BLD: 286 K/CU MM (ref 140–440)
PLESIOMONAS SHIGELLOIDES PCR: NOT DETECTED
PLT MORPHOLOGY: ABNORMAL
PMV BLD AUTO: 10.2 FL (ref 7.5–11.1)
POTASSIUM SERPL-SCNC: 3.9 MMOL/L (ref 3.5–5.1)
POTASSIUM SERPL-SCNC: 4 MMOL/L (ref 3.5–5.1)
PROMYELOCYTES ABSOLUTE COUNT: 0.56 K/CU MM
PROMYELOCYTES PERCENT: 1 %
RBC # BLD: 3.88 M/CU MM (ref 4.6–6.2)
ROTAVIRUS A PCR: NOT DETECTED
SALMONELLA PCR: NOT DETECTED
SAPOVIRUS PCR: NOT DETECTED
SEGMENTED NEUTROPHILS ABSOLUTE COUNT: 28.1 K/CU MM
SEGMENTED NEUTROPHILS RELATIVE PERCENT: 50 % (ref 36–66)
SODIUM BLD-SCNC: 135 MMOL/L (ref 135–145)
SODIUM BLD-SCNC: 136 MMOL/L (ref 135–145)
SOURCE: ABNORMAL
SPECIMEN: NORMAL
TOTAL PROTEIN: 5 GM/DL (ref 6.4–8.2)
VIBRIO CHOLERAE PCR: NOT DETECTED
VIBRIO PCR: NOT DETECTED
WBC # BLD: 56.2 K/CU MM (ref 4–10.5)
WBC # BLD: ABNORMAL 10*3/UL
YERSINIA ENTEROCOLITICA PCR: NOT DETECTED

## 2022-12-25 PROCEDURE — 2500000003 HC RX 250 WO HCPCS: Performed by: STUDENT IN AN ORGANIZED HEALTH CARE EDUCATION/TRAINING PROGRAM

## 2022-12-25 PROCEDURE — 2500000003 HC RX 250 WO HCPCS: Performed by: INTERNAL MEDICINE

## 2022-12-25 PROCEDURE — 80202 ASSAY OF VANCOMYCIN: CPT

## 2022-12-25 PROCEDURE — 2580000003 HC RX 258: Performed by: STUDENT IN AN ORGANIZED HEALTH CARE EDUCATION/TRAINING PROGRAM

## 2022-12-25 PROCEDURE — 6360000002 HC RX W HCPCS: Performed by: PHYSICIAN ASSISTANT

## 2022-12-25 PROCEDURE — 80048 BASIC METABOLIC PNL TOTAL CA: CPT

## 2022-12-25 PROCEDURE — 6370000000 HC RX 637 (ALT 250 FOR IP): Performed by: INTERNAL MEDICINE

## 2022-12-25 PROCEDURE — 87507 IADNA-DNA/RNA PROBE TQ 12-25: CPT

## 2022-12-25 PROCEDURE — 6360000002 HC RX W HCPCS: Performed by: STUDENT IN AN ORGANIZED HEALTH CARE EDUCATION/TRAINING PROGRAM

## 2022-12-25 PROCEDURE — 2000000000 HC ICU R&B

## 2022-12-25 PROCEDURE — 6360000002 HC RX W HCPCS: Performed by: INTERNAL MEDICINE

## 2022-12-25 PROCEDURE — 74018 RADEX ABDOMEN 1 VIEW: CPT

## 2022-12-25 PROCEDURE — 2580000003 HC RX 258: Performed by: PHYSICIAN ASSISTANT

## 2022-12-25 PROCEDURE — 6370000000 HC RX 637 (ALT 250 FOR IP): Performed by: PHYSICIAN ASSISTANT

## 2022-12-25 PROCEDURE — 85027 COMPLETE CBC AUTOMATED: CPT

## 2022-12-25 PROCEDURE — 2580000003 HC RX 258: Performed by: INTERNAL MEDICINE

## 2022-12-25 PROCEDURE — 94761 N-INVAS EAR/PLS OXIMETRY MLT: CPT

## 2022-12-25 PROCEDURE — 82962 GLUCOSE BLOOD TEST: CPT

## 2022-12-25 PROCEDURE — P9047 ALBUMIN (HUMAN), 25%, 50ML: HCPCS

## 2022-12-25 PROCEDURE — 80053 COMPREHEN METABOLIC PANEL: CPT

## 2022-12-25 PROCEDURE — 6360000002 HC RX W HCPCS

## 2022-12-25 PROCEDURE — 83605 ASSAY OF LACTIC ACID: CPT

## 2022-12-25 PROCEDURE — 2700000000 HC OXYGEN THERAPY PER DAY

## 2022-12-25 PROCEDURE — 85007 BL SMEAR W/DIFF WBC COUNT: CPT

## 2022-12-25 PROCEDURE — P9045 ALBUMIN (HUMAN), 5%, 250 ML: HCPCS | Performed by: STUDENT IN AN ORGANIZED HEALTH CARE EDUCATION/TRAINING PROGRAM

## 2022-12-25 RX ORDER — LORAZEPAM 2 MG/ML
1 INJECTION INTRAMUSCULAR EVERY 6 HOURS PRN
Status: DISCONTINUED | OUTPATIENT
Start: 2022-12-25 | End: 2022-12-26 | Stop reason: HOSPADM

## 2022-12-25 RX ORDER — ALBUMIN (HUMAN) 12.5 G/50ML
25 SOLUTION INTRAVENOUS ONCE
Status: COMPLETED | OUTPATIENT
Start: 2022-12-25 | End: 2022-12-25

## 2022-12-25 RX ORDER — MORPHINE SULFATE 4 MG/ML
4 INJECTION, SOLUTION INTRAMUSCULAR; INTRAVENOUS
Status: DISCONTINUED | OUTPATIENT
Start: 2022-12-25 | End: 2022-12-26 | Stop reason: HOSPADM

## 2022-12-25 RX ORDER — HEPARIN SODIUM 5000 [USP'U]/ML
5000 INJECTION, SOLUTION INTRAVENOUS; SUBCUTANEOUS EVERY 8 HOURS SCHEDULED
Status: DISCONTINUED | OUTPATIENT
Start: 2022-12-25 | End: 2022-12-26 | Stop reason: HOSPADM

## 2022-12-25 RX ORDER — 0.9 % SODIUM CHLORIDE 0.9 %
500 INTRAVENOUS SOLUTION INTRAVENOUS ONCE
Status: COMPLETED | OUTPATIENT
Start: 2022-12-25 | End: 2022-12-25

## 2022-12-25 RX ORDER — CALCIUM GLUCONATE 20 MG/ML
1000 INJECTION, SOLUTION INTRAVENOUS ONCE
Status: COMPLETED | OUTPATIENT
Start: 2022-12-25 | End: 2022-12-25

## 2022-12-25 RX ORDER — ALBUMIN (HUMAN) 12.5 G/50ML
SOLUTION INTRAVENOUS
Status: COMPLETED
Start: 2022-12-25 | End: 2022-12-25

## 2022-12-25 RX ORDER — ALBUMIN, HUMAN INJ 5% 5 %
25 SOLUTION INTRAVENOUS ONCE
Status: COMPLETED | OUTPATIENT
Start: 2022-12-25 | End: 2022-12-25

## 2022-12-25 RX ORDER — MORPHINE SULFATE 2 MG/ML
2 INJECTION, SOLUTION INTRAMUSCULAR; INTRAVENOUS
Status: DISCONTINUED | OUTPATIENT
Start: 2022-12-25 | End: 2022-12-26 | Stop reason: HOSPADM

## 2022-12-25 RX ADMIN — Medication 20 MG: at 09:43

## 2022-12-25 RX ADMIN — VANCOMYCIN HYDROCHLORIDE 250 MG: 125 CAPSULE ORAL at 02:22

## 2022-12-25 RX ADMIN — VANCOMYCIN HYDROCHLORIDE 250 MG: 125 CAPSULE ORAL at 10:01

## 2022-12-25 RX ADMIN — METRONIDAZOLE 500 MG: 500 INJECTION, SOLUTION INTRAVENOUS at 20:03

## 2022-12-25 RX ADMIN — HYDROCORTISONE SODIUM SUCCINATE 50 MG: 100 INJECTION, POWDER, FOR SOLUTION INTRAMUSCULAR; INTRAVENOUS at 19:55

## 2022-12-25 RX ADMIN — SODIUM CHLORIDE 500 ML: 9 INJECTION, SOLUTION INTRAVENOUS at 09:58

## 2022-12-25 RX ADMIN — METRONIDAZOLE 500 MG: 500 INJECTION, SOLUTION INTRAVENOUS at 12:00

## 2022-12-25 RX ADMIN — HYDROCORTISONE SODIUM SUCCINATE 50 MG: 100 INJECTION, POWDER, FOR SOLUTION INTRAMUSCULAR; INTRAVENOUS at 09:44

## 2022-12-25 RX ADMIN — MORPHINE SULFATE 4 MG: 4 INJECTION, SOLUTION INTRAMUSCULAR; INTRAVENOUS at 19:55

## 2022-12-25 RX ADMIN — MORPHINE SULFATE 4 MG: 4 INJECTION, SOLUTION INTRAMUSCULAR; INTRAVENOUS at 22:00

## 2022-12-25 RX ADMIN — SODIUM CHLORIDE, PRESERVATIVE FREE 10 ML: 5 INJECTION INTRAVENOUS at 19:57

## 2022-12-25 RX ADMIN — CEFEPIME HYDROCHLORIDE 500 MG: 1 INJECTION, POWDER, FOR SOLUTION INTRAMUSCULAR; INTRAVENOUS at 17:31

## 2022-12-25 RX ADMIN — SODIUM CHLORIDE 500 ML: 9 INJECTION, SOLUTION INTRAVENOUS at 09:03

## 2022-12-25 RX ADMIN — SODIUM BICARBONATE: 84 INJECTION, SOLUTION INTRAVENOUS at 17:31

## 2022-12-25 RX ADMIN — FIDAXOMICIN 200 MG: 200 TABLET, FILM COATED ORAL at 19:56

## 2022-12-25 RX ADMIN — ALBUMIN (HUMAN) 25 G: 0.25 INJECTION, SOLUTION INTRAVENOUS at 14:40

## 2022-12-25 RX ADMIN — ALBUMIN (HUMAN) 25 G: 12.5 INJECTION, SOLUTION INTRAVENOUS at 15:51

## 2022-12-25 RX ADMIN — HYDROCORTISONE SODIUM SUCCINATE 50 MG: 100 INJECTION, POWDER, FOR SOLUTION INTRAMUSCULAR; INTRAVENOUS at 14:59

## 2022-12-25 RX ADMIN — Medication 30 MCG/MIN: at 14:47

## 2022-12-25 RX ADMIN — SODIUM CHLORIDE, PRESERVATIVE FREE 10 ML: 5 INJECTION INTRAVENOUS at 08:14

## 2022-12-25 RX ADMIN — SODIUM BICARBONATE 50 MEQ: 84 INJECTION, SOLUTION INTRAVENOUS at 09:44

## 2022-12-25 RX ADMIN — ALBUMIN (HUMAN) 25 G: 12.5 SOLUTION INTRAVENOUS at 14:40

## 2022-12-25 RX ADMIN — VASOPRESSIN 0.03 UNITS/MIN: 0.2 INJECTION INTRAVENOUS at 18:18

## 2022-12-25 RX ADMIN — FIDAXOMICIN 200 MG: 200 TABLET, FILM COATED ORAL at 11:19

## 2022-12-25 RX ADMIN — METRONIDAZOLE 500 MG: 500 INJECTION, SOLUTION INTRAVENOUS at 05:26

## 2022-12-25 RX ADMIN — CALCIUM GLUCONATE 1000 MG: 20 INJECTION, SOLUTION INTRAVENOUS at 15:49

## 2022-12-25 RX ADMIN — VASOPRESSIN 0.03 UNITS/MIN: 0.2 INJECTION INTRAVENOUS at 06:21

## 2022-12-25 RX ADMIN — PHENYLEPHRINE HYDROCHLORIDE 30 MCG/MIN: 50 INJECTION INTRAVENOUS at 14:13

## 2022-12-25 RX ADMIN — HEPARIN SODIUM 5000 UNITS: 5000 INJECTION INTRAVENOUS; SUBCUTANEOUS at 22:00

## 2022-12-25 RX ADMIN — SODIUM BICARBONATE 50 MEQ: 84 INJECTION, SOLUTION INTRAVENOUS at 09:43

## 2022-12-25 RX ADMIN — SODIUM BICARBONATE: 84 INJECTION, SOLUTION INTRAVENOUS at 08:08

## 2022-12-25 ASSESSMENT — PAIN DESCRIPTION - LOCATION
LOCATION: ABDOMEN
LOCATION: ABDOMEN

## 2022-12-25 ASSESSMENT — PAIN DESCRIPTION - ONSET: ONSET: ON-GOING

## 2022-12-25 ASSESSMENT — ENCOUNTER SYMPTOMS
COLOR CHANGE: 1
SHORTNESS OF BREATH: 0
EYE DISCHARGE: 0
VOMITING: 0
DIARRHEA: 1
CHEST TIGHTNESS: 0
EYE PAIN: 0
BACK PAIN: 1
NAUSEA: 1
BLOOD IN STOOL: 0
ABDOMINAL DISTENTION: 1
VOICE CHANGE: 0
SINUS PAIN: 0
SINUS PRESSURE: 0
COUGH: 0
ABDOMINAL PAIN: 0

## 2022-12-25 ASSESSMENT — PAIN SCALES - GENERAL
PAINLEVEL_OUTOF10: 7
PAINLEVEL_OUTOF10: 0

## 2022-12-25 ASSESSMENT — PAIN - FUNCTIONAL ASSESSMENT
PAIN_FUNCTIONAL_ASSESSMENT: ACTIVITIES ARE NOT PREVENTED
PAIN_FUNCTIONAL_ASSESSMENT: ACTIVITIES ARE NOT PREVENTED

## 2022-12-25 ASSESSMENT — PAIN DESCRIPTION - DIRECTION: RADIATING_TOWARDS: ABDOMEN

## 2022-12-25 ASSESSMENT — PAIN DESCRIPTION - DESCRIPTORS
DESCRIPTORS: ACHING;DISCOMFORT
DESCRIPTORS: ACHING

## 2022-12-25 ASSESSMENT — PAIN DESCRIPTION - ORIENTATION
ORIENTATION: RIGHT
ORIENTATION: RIGHT

## 2022-12-25 ASSESSMENT — PAIN DESCRIPTION - PAIN TYPE: TYPE: ACUTE PAIN

## 2022-12-25 ASSESSMENT — PAIN DESCRIPTION - FREQUENCY: FREQUENCY: CONTINUOUS

## 2022-12-25 NOTE — CONSULTS
V2.0  [facility] Tele-Critical Care Consult Note      Name:  Rayray Bautista /Age/Sex: 1945  (76 y.o. male)   MRN & CSN:  [id number] & [id number] Encounter Date/Time: 2022 7:52 PM EST   Location:  TR- PCP: CATHY Vee - CNP     Attending:Adrianna Mane MD  Consulting Provider: Catracho Hughes Day: 1    This is a telemedicine visit  Physician Location: [facility] working from home   Patient Location: [facility] at [address] [place] [address] and Recommendations   Rayray Bautista is a 76 y.o. male with past medical history of altered mental status who presents with Septic shock [facility])    Hospital Problems             Last Modified POA    * (Principal) Septic shock ([facility] [address] 2022 Yes       Recommendations:    Septic shock likely due to UTI vs C diff colitis   The patient has evidence of UTI on UA and Colitis on CT abdomen. His wbc is 40k. This favors C diff colitis. Will treat with empiric cefepime, flagyl and oral vancomycin. Blood and urine cultures obtained   Will get stool for C diff toxin/ag  He received 3 liters of NS in the ED. Lactate was elevated. Will repeat lactate. Central line placed and levophed started   I will add vasopressin     Acute renal failure  Metabolic acidosis   Due to sepsis   Will add bicarb drip   Serial labs     Metabolic encephalopathy   Has history of CVA, encephalomalacia on CT   Will monitor     DM2  Add insulin sliding scale. 5. Unstageable decubitus ulcer  Present on admission   Wound care consult in am           Diet Diet NPO   DVT Prophylaxis    PUD Prophylaxis  [] Lovenox, []  Heparin, [x] SCDs, [] Ambulation,  [] Eliquis, [] Xarelto  [x] Pepcid  [] Protonix   Code Status DNR-CCA   Surrogate Decision Maker/ POA  Daughter    35 minutes of Critical care time, independent of time by other medical providers .   History Obtained From:    patient, electronic medical record    History of Present Illness:     Chief Complaint: Septic [facility])    Rayray Bautista is a 76 y.o. male who is seen today by the intensivist with a Chief Complaint of altered mental status. He was poorly responsive, passed out twice today. He did have some food in his mouth and was removed by the ED physician. He became hypotensive in the ED. A central line was placed and started on levophed. Evaluation in the ED showed a wbc of 40k, UA that is positive, CT abdomen that showed evidence of colitis. Review of Systems:    Review of Systems  Unobtainable due to AMS. Objective:   No intake or output data in the 24 hours ending 12/24/22 1952   Vitals:   Vitals:    12/24/22 1905 12/24/22 1915 12/24/22 1920 12/24/22 1925   BP: (!) 86/54 (!) 88/58 (!) 73/47 (!) 77/48   Pulse: 98 96 97 97   Resp: 16 12 (!) 9 15   Temp:       TempSrc:       SpO2:           Medications Prior to Admission     Prior to Admission medications    Medication Sig Start Date End Date Taking?  Authorizing Provider   acetaminophen (TYLENOL) 500 MG tablet Take 500 mg by mouth every 6 hours as needed for Pain    Historical Provider, MD   metoprolol succinate (TOPROL XL) 200 MG extended release tablet Take 1 tablet by mouth daily 2/28/22   Ron Vigil DO   atorvastatin (LIPITOR) 40 MG tablet Take 40 mg by mouth daily    Historical Provider, MD   gabapentin (NEURONTIN) 100 MG capsule Take 100 mg by mouth nightly Take 1- 3 tabs at night    Historical Provider, MD   metFORMIN (GLUCOPHAGE) 1000 MG tablet Take 1,000 mg by mouth 2 times daily (with meals)    Historical Provider, MD   traMADol (ULTRAM) 50 MG tablet Take 50 mg by mouth every 8 hours as needed for Pain    Historical Provider, MD   Cyanocobalamin (VITAMIN B 12 PO) Take 1,000 mg by mouth daily    Historical Provider, MD   Pyridoxine HCl (VITAMIN B-6) 50 MG tablet Take 50 mg by mouth daily    Historical Provider, MD       Physical Exam: Need 8 Elements   Exam is performed through direct observation by video conferencing along with assistance from the bedside nurse     GA: unresponsive   Cardiovascular: Regular rate, rhythm  Respiratory: Clear to auscultation  Gastrointestinal: Soft        Past Medical History:   PMHx   Past Medical History:   Diagnosis Date    Arthritis     Cancer ([facility] [address]     hx bladder cancer- dx - following with Dr Kadi Treadwell    Chronic back pain     \"on Tramadol- had back surgery in the past\"    Diabetes mellitus ([facility] [address]     [id number]    History of blood transfusion     \"in \"    History of kidney stones     History of motion sickness     Hyperlipidemia     Hypertension     PONV (postoperative nausea and vomiting)     Sleep apnea     sleep study -\"could not tolerate the cpap machine\"     PSHX:  has a past surgical history that includes Lithotripsy (2010(per old chart)); Colonoscopy (); shoulder surgery; Varicose vein surgery; knee surgery (Right, ); Cystocopy (); Cystoscopy (); back surgery; joint replacement; eye surgery (); and Cystocopy (2016). Allergies: Allergies   Allergen Reactions    Oxycontin [Oxycodone Hcl]      hallucinations    Penicillins Hives     Fam HX:family history includes Diabetes in his father; Heart Disease in his mother.   Soc HX:   Social History     Socioeconomic History    Marital status:    Tobacco Use    Smoking status: Former     Packs/day: 3.00     Years: 15.00     Pack years: 45.00     Types: Cigarettes     Quit date: 1985     Years since quittin.9    Smokeless tobacco: Never   Substance and Sexual Activity    Alcohol use: No    Drug use: No       Medications:   Medications:    sodium chloride flush  5-40 mL IntraVENous 2 times per day    famotidine (PEPCID) injection  20 mg IntraVENous Daily    cefepime  1,000 mg IntraVENous Q12H    metroNIDAZOLE  500 mg IntraVENous Q8H    vancomycin  250 mg Oral Q6H      Infusions:    norepinephrine 10 mcg/min (22)    sodium chloride      norepinephrine      vasopressin      lactated ringers PRN Meds: sodium chloride flush, 5-40 mL, PRN  sodium chloride, , PRN  ondansetron, 4 mg, Q8H PRN   Or  ondansetron, 4 mg, Q6H PRN  acetaminophen, 650 mg, Q6H PRN   Or  acetaminophen, 650 mg, Q6H PRN        Labs and Imaging   CT ABDOMEN PELVIS WO CONTRAST Additional Contrast? None    Result Date: 12/24/2022  EXAMINATION: CT OF THE ABDOMEN AND PELVIS WITHOUT CONTRAST 12/24/2022 6:48 pm TECHNIQUE: CT of the abdomen and pelvis was performed without the administration of intravenous contrast. Multiplanar reformatted images are provided for review. Automated exposure control, iterative reconstruction, and/or weight based adjustment of the mA/kV was utilized to reduce the radiation dose to as low as reasonably achievable. COMPARISON: CT 12/12/2017 HISTORY: ORDERING SYSTEM PROVIDED HISTORY: ams TECHNOLOGIST PROVIDED HISTORY: Reason for exam:->ams Additional Contrast?->None Decision Support Exception - unselect if not a suspected or confirmed emergency medical condition->Emergency Medical Condition (MA) Reason for Exam: ams; unresponsive Relevant Medical/Surgical History: none FINDINGS: Lower Chest: Mild bibasilar atelectasis. Organs: Limited evaluation of the intra-abdominal organs given the lack of intravenous contrast.  Within this limitation, the liver, spleen, pancreas, and adrenal glands demonstrate no acute abnormality. There is cholelithiasis. There are bilateral double-J ureteral stents. The proximal loop of the left ureteral stent is at the level of the left UPJ. There are foci of air in the left renal collecting system and in the left ureter. GI/Bowel: Circumferential wall thickening/edema is seen involving a large portion of the colon. No bowel obstruction is seen. No definitive findings of acute appendicitis are seen. Pelvis: There is a Washington catheter in the bladder along with bilateral ureteral stents. There is air in the bladder.  Peritoneum/Retroperitoneum: There is a 4.1 cm infrarenal abdominal aortic aneurysm. No lymphadenopathy is seen. No intraperitoneal free air. There is small volume free fluid. Bones/Soft Tissues: Postsurgical changes are seen from posterior lumbar spinal fusion. No acute bony abnormality is seen. There is also hardware in the right proximal femur. Diffuse colonic wall thickening/edema compatible with a nonspecific colitis. No bowel obstruction is seen. There are bilateral ureteral stents along with a Washington catheter. There is air in the left renal collecting system, left ureter, and in the urinary bladder. Correlate for signs of a urinary tract infection. Cholelithiasis. CT HEAD WO CONTRAST    Result Date: 12/24/2022  EXAMINATION: CT OF THE HEAD WITHOUT CONTRAST  12/24/2022 6:48 pm TECHNIQUE: CT of the head was performed without the administration of intravenous contrast. Automated exposure control, iterative reconstruction, and/or weight based adjustment of the mA/kV was utilized to reduce the radiation dose to as low as reasonably achievable. COMPARISON: 10/12/2020 HISTORY: ORDERING SYSTEM PROVIDED HISTORY: HEAD TRAUMA, CLOSED, MILD, GCS >= 13, NO RISK FACTORS, NEURO EXAM NORMAL TECHNOLOGIST PROVIDED HISTORY: Has a \"code stroke\" or \"stroke alert\" been called? ->No Reason for exam:->AMS Decision Support Exception - unselect if not a suspected or confirmed emergency medical condition->Emergency Medical Condition (MA) Reason for Exam: AMS, Head trauma, closed, mild, GCS >= 13, no risk factors, neuro exam normal Relevant Medical/Surgical History: none FINDINGS: BRAIN/VENTRICLES: There is no acute intracranial hemorrhage, mass effect or midline shift. No abnormal extra-axial fluid collection. The gray-white differentiation is maintained without evidence of an acute infarct. There is no evidence of hydrocephalus. Mild chronic white matter ischemic changes. New small focus of mesial right occipital encephalomalacia.  ORBITS: The visualized portion of the orbits demonstrate no acute abnormality. SINUSES: The visualized paranasal sinuses and mastoid air cells demonstrate no acute abnormality. SOFT TISSUES/SKULL:  No acute calvarial fracture or focal soft tissue swelling. Age-indeterminate bilateral nasal bone fractures. 1. No acute intracranial abnormality. 2. Stable mild chronic white matter microvascular ischemic changes. 3. New small focus of mesial right occipital encephalomalacia in keeping with sequela of infarct that has occurred since 10/12/2020. 4. Age-indeterminate bilateral nasal bone fractures. XR CHEST PORTABLE    Result Date: 12/24/2022  EXAMINATION: ONE XRAY VIEW OF THE CHEST 12/24/2022 5:26 pm COMPARISON: None. HISTORY: ORDERING SYSTEM PROVIDED HISTORY: dyspnea TECHNOLOGIST PROVIDED HISTORY: Reason for exam:->dyspnea Reason for Exam: dyspnea FINDINGS: A right chest port terminates in the right atrium. The cardiomediastinal silhouette is unchanged. No pneumothorax, vascular congestion, consolidation, or pleural effusion is identified. No acute osseous abnormality. No acute process. CBC:   Recent Labs     12/24/22  1616   WBC 40.2*   HGB 11.7*        BMP:    Recent Labs     12/24/22  1616 12/24/22  1858   *  --    K 4.6  --    CL 92*  --    CO2 15*  --    BUN 70*  --    CREATININE 5.5*  --    GLUCOSE 139* 189     Hepatic:   Recent Labs     12/24/22  1616   AST 30   ALT 16   BILITOT 0.4   ALKPHOS 107     Lipids:   Lab Results   Component Value Date/Time    CHOL 193 06/17/2016 08:53 AM    HDL 32 06/17/2016 08:53 AM    TRIG 236 06/17/2016 08:53 AM     Hemoglobin A1C:   Lab Results   Component Value Date/Time    LABA1C 6.4 06/17/2016 08:53 AM     TSH: No results found for: TSH  Troponin: No results found for: TROPONINT  Lactic Acid: No results for input(s): LACTA in the last 72 hours. BNP: No results for input(s): PROBNP in the last 72 hours.   UA:  Lab Results   Component Value Date/Time    NITRU NEGATIVE 12/24/2022 05:00 PM    COLORU YELLOW 12/24/2022 05:00 PM    40 Latoya Thierno 12/24/2022 05:00 PM    RBCUA 823 12/24/2022 05:00 PM    MUCUS RARE 09/29/2014 05:15 AM    TRICHOMONAS NONE SEEN 12/24/2022 05:00 PM    BACTERIA NEGATIVE 12/24/2022 05:00 PM    CLARITYU TURBID 12/24/2022 05:00 PM    SPECGRAV 1.020 12/24/2022 05:00 PM    LEUKOCYTESUR LARGE 12/24/2022 05:00 PM    UROBILINOGEN NORMAL 12/24/2022 05:00 PM    BILIRUBINUR NEGATIVE 12/24/2022 05:00 PM    BLOODU LARGE 12/24/2022 05:00 PM    KETUA NEGATIVE 12/24/2022 05:00 PM     Urine Cultures: No results found for: LABURIN  Blood Cultures: No results found for: BC  No results found for: BLOODCULT2  Organism:   Lab Results   Component Value Date/Time    ORG ENTBC 09/14/2014 03:00 PM       Personally reviewed Lab Studies, Imaging, and discussed with ED physician, hospitalist and RN     Electronically signed by Elvira Boyce MD on 12/24/2022 at 7:52 PM

## 2022-12-25 NOTE — PROGRESS NOTES
RENAL DOSE ADJUSTMENT MADE PER P/T PROTOCOL    PREVIOUS ORDER:  Cefepime 1 g q12h    Estimated Creatinine Clearance: 10 mL/min (A) (based on SCr of 5.5 mg/dL (H)).   Recent Labs     12/24/22  1616   BUN 70*   CREATININE 5.5*      INR 0.98     NEW RENALLY ADJUSTED ORDER:  Cefepime 500 mg q24h for UTI    Kyle Keller [facility]  12/25/2022 2:54 AM

## 2022-12-25 NOTE — PROGRESS NOTES
Updated patient's family regarding patient's status and the critical nature of the condition. Updated regarding worsening renal failure with a high anion gap metabolic acidosis, could lead to potential cardiac arrest, patient requiring 3 vasopressors. Valentina- patient's daughter- POA voiced understanding. She do not want any escalation of care, continue current management, leave him on the current medication/vasopressors, and do not escalate. CODE STATUS changed to DNR CC, comfort care medications orders placed. Patient's daughter, and rest of the family to visit him tonight.

## 2022-12-25 NOTE — PROGRESS NOTES
V2.0  [facility] [place] Hospitalist Progress Note      Name:  Arjun Sullivan /Age/Sex: 1945  (76 y.o. male)   MRN & CSN:  [id number] & [id number] Encounter Date/Time: 2022 12:45 PM EST    Location:  -A PCP: Yunior Cintron Day: 2    Assessment and Plan:   Arjun Sullivan is a 76 y.o. male with pmh of history of recurrent bladder cancer, history of DVT, diverticulosis, diabetes complicated with diabetic neuropathy, URIEL, depression, BPH who presents with Septic shock ([facility] [address]      Plan:  Severe sepsis with septic shock in the setting of suspected severe urosepsis and acute fulminant C. difficile infection: WBC is more than 40,000, lactate was 4.8 trending down. Requiring 2 pressors. ICU is primary. Blood cultures are pending. C. difficile test is pending. CT abdomen pelvis showed signs of colonic wall thickening/edema compatible with nonspecific colitis. Currently on IV vancomycin, cefepime, Flagyl, p.o. vancomycin. Dificid also ordered. Acute metabolic encephalopathy in the setting of above  CORNELIA on CKD, improving. History of ureteral stents, CT abdomen pelvis showed signs of air in the left renal collecting system and left ureter into the bladder. History of Citrobacter freundii infection in 2022. History of Enterococcus faecalis infection in 2022. Continue to monitor urine culture pending  History of recurrent bladder cancer with chronic indwelling catheter: Exchanged because of concern for urosepsis and complicated UTI. And negative metabolic acidosis in the setting of above  History of recurrent bladder cancer: Initial diagnosis was in , under surveillance. Mitomycin-C bladder instillation in . 2017 patient was diagnosed with high-grade urothelial carcinoma with history of partial cystectomy. Follows up with [facility]. urology outpatient.   Name of the doctor is Dr. Kenyatta Garsia  History of DVT of left lower extremity in 2018 completed AC therapy  Diverticulosis  Non-insulin-dependent type II  type II on SSI  Diabetic neuropathy on gabapentin  BPH on tamsulosin  Depression/anxiety: Bupropion and mirtazapine  Chronic anemia on ferrous sulfate    Diet Diet NPO  ADULT ORAL NUTRITION SUPPLEMENT; HS Snack; Standard High Calorie/High Protein Oral Supplement   DVT Prophylaxis [x] Lovenox, []  Heparin, [] SCDs, [] Ambulation,  [] Eliquis, [] Xarelto  [] Coumadin   Code Status DNR-CCA   Disposition From: Home  Expected Disposition: Rehab? Estimated Date of Discharge: To be declared pending medical stability  Patient requires continued admission due to on pressors   Surrogate Decision Maker/ POA      Subjective:     Chief Complaint: Other (unresponsive)     The patient has NG in place. Patient was seen at the bedside. The patient is still having diarrhea rectal tube in place. Washington in place. Appropriate urine output. Patient has urine cultures pending. On 2 pressors right now. Map goal is above 65. Continue to monitor. Requiring 4 antibiotics including p.o. antibiotics to cover for C. difficile     Review of Systems:    Review of Systems   Constitutional:  Positive for activity change, appetite change, fatigue and unexpected weight change. Negative for chills and fever. HENT:  Negative for ear pain, hearing loss, sinus pressure, sinus pain and voice change. Eyes:  Negative for pain, discharge and visual disturbance. Respiratory:  Negative for cough, chest tightness and shortness of breath. Cardiovascular:  Negative for chest pain, palpitations and leg swelling. Gastrointestinal:  Positive for abdominal distention, diarrhea and nausea. Negative for abdominal pain, blood in stool and vomiting. Genitourinary:  Negative for dysuria and frequency. Musculoskeletal:  Positive for arthralgias and back pain. Skin:  Positive for color change. Neurological:  Positive for dizziness, weakness, light-headedness and headaches. Psychiatric/Behavioral:  Negative for sleep disturbance. Objective: Intake/Output Summary (Last 24 hours) at 12/25/2022 1245  Last data filed at 12/25/2022 0703  Gross per 24 hour   Intake --   Output 60 ml   Net -60 ml        Vitals:   Vitals:    12/25/22 1215   BP: (!) 85/55   Pulse: (!) 114   Resp: 20   Temp:    SpO2:        Physical Exam:   Physical Exam  Vitals reviewed. Constitutional:       Appearance: Normal appearance. He is normal weight. He is ill-appearing. HENT:      Head: Normocephalic. Right Ear: Tympanic membrane normal.      Left Ear: Tympanic membrane normal.      Nose: Nose normal.      Mouth/Throat:      Mouth: Mucous membranes are dry. Eyes:      Extraocular Movements: Extraocular movements intact. Conjunctiva/sclera: Conjunctivae normal.      Pupils: Pupils are equal, round, and reactive to light. Cardiovascular:      Rate and Rhythm: Normal rate and regular rhythm. Pulses: Normal pulses. Heart sounds: Normal heart sounds. No murmur heard. Pulmonary:      Effort: Pulmonary effort is normal.      Breath sounds: Normal breath sounds. No wheezing, rhonchi or rales. Abdominal:      General: Abdomen is flat. Bowel sounds are normal. There is distension. Palpations: Abdomen is soft. Tenderness: There is no abdominal tenderness. Musculoskeletal:         General: No deformity. Normal range of motion. Cervical back: Normal range of motion and neck supple. Right lower leg: No edema. Left lower leg: No edema. Skin:     General: Skin is dry. Coloration: Skin is not jaundiced or pale. Neurological:      General: No focal deficit present. Mental Status: He is alert and oriented to person, place, and time. Mental status is at baseline. Motor: Weakness present.    Psychiatric:         Mood and Affect: Mood normal.         Behavior: Behavior normal.          Medications:   Medications:    cefepime  500 mg IntraVENous Q24H hydrocortisone sodium succinate PF  50 mg IntraVENous Q6H    Fidaxomicin  200 mg Per NG tube BID    sodium chloride flush  5-40 mL IntraVENous 2 times per day    famotidine (PEPCID) injection  20 mg IntraVENous Daily    metroNIDAZOLE  500 mg IntraVENous Q8H    insulin lispro  0-8 Units SubCUTAneous TID WC    insulin lispro  0-4 Units SubCUTAneous Nightly      Infusions:    sodium chloride      norepinephrine 25 mcg/min (12/25/22 0920)    vasopressin 0.03 Units/min (12/25/22 0621)    sodium bicarbonate infusion 125 mL/hr at 12/25/22 0808     PRN Meds: sodium chloride flush, 5-40 mL, PRN  sodium chloride, , PRN  ondansetron, 4 mg, Q8H PRN   Or  ondansetron, 4 mg, Q6H PRN  acetaminophen, 650 mg, Q6H PRN   Or  acetaminophen, 650 mg, Q6H PRN        Labs      Recent Results (from the past 24 hour(s))   CBC with Auto Differential    Collection Time: 12/24/22  4:16 PM   Result Value Ref Range    WBC 40.2 (HH) 4.0 - 10.5 K/CU MM    RBC 3.85 (L) 4.6 - 6.2 M/CU MM    Hemoglobin 11.7 (L) 13.5 - 18.0 GM/DL    Hematocrit 37.9 (L) 42 - 52 %    MCV 98.4 78 - 100 FL    MCH 30.4 27 - 31 PG    MCHC 30.9 (L) 32.0 - 36.0 %    RDW 17.1 (H) 11.7 - 14.9 %    Platelets 850 382 - 204 K/CU MM    MPV 10.1 7.5 - 11.1 FL    Bands Relative 3 (L) 5 - 11 %    Segs Relative 77.0 (H) 36 - 66 %    Lymphocytes % 12.0 (L) 24 - 44 %    Monocytes % 8.0 (H) 0 - 4 %    Bands Absolute 1.21 K/CU MM    Segs Absolute 31.0 K/CU MM    Lymphocytes Absolute 4.8 K/CU MM    Monocytes Absolute 3.2 K/CU MM    Differential Type MANUAL DIFFERENTIAL     Anisocytosis 1+     Polychromasia 1+     PLT Morphology FEW LARGE PLATELETS    Comprehensive Metabolic Panel    Collection Time: 12/24/22  4:16 PM   Result Value Ref Range    Sodium 132 (L) 135 - 145 MMOL/L    Potassium 4.6 3.5 - 5.1 MMOL/L    Chloride 92 (L) 99 - 110 mMol/L    CO2 15 (L) 21 - 32 MMOL/L    BUN 70 (H) 6 - 23 MG/DL    Creatinine 5.5 (H) 0.9 - 1.3 MG/DL    Est, Glom Filt Rate 10 (L) >60 mL/min/1.73m2 Glucose 139 (H) 70 - 99 MG/DL    Calcium 8.6 8.3 - 10.6 MG/DL    Albumin 2.6 (L) 3.4 - 5.0 GM/DL    Total Protein 6.8 6.4 - 8.2 GM/DL    Total Bilirubin 0.4 0.0 - 1.0 MG/DL    ALT 16 10 - 40 U/L    AST 30 15 - 37 IU/L    Alkaline Phosphatase 107 40 - 129 IU/L    Anion Gap 25 (H) 4 - 16   Lipase    Collection Time: 12/24/22  4:16 PM   Result Value Ref Range    Lipase 85 (H) 13 - 60 IU/L   Lactic Acid    Collection Time: 12/24/22  4:16 PM   Result Value Ref Range    Lactate 5.8 (HH) 0.5 - 1.9 mMOL/L   Protime/INR & PTT    Collection Time: 12/24/22  4:16 PM   Result Value Ref Range    Protime 12.7 11.7 - 14.5 SECONDS    INR 0.98 INDEX    aPTT 25.0 (L) 25.1 - 37.1 SECONDS   CK    Collection Time: 12/24/22  4:16 PM   Result Value Ref Range    Total  (H) 38 - 174 IU/L   Magnesium    Collection Time: 12/24/22  4:16 PM   Result Value Ref Range    Magnesium 2.5 (H) 1.8 - 2.4 mg/dl   TSH    Collection Time: 12/24/22  4:16 PM   Result Value Ref Range    TSH, High Sensitivity 6.080 (H) 0.270 - 4.20 uIu/ml   EKG 12 Lead    Collection Time: 12/24/22  4:19 PM   Result Value Ref Range    Ventricular Rate 122 BPM    Atrial Rate 122 BPM    P-R Interval 152 ms    QRS Duration 102 ms    Q-T Interval 306 ms    QTc Calculation (Bazett) 436 ms    P Axis 34 degrees    R Axis -29 degrees    T Axis 35 degrees    Diagnosis       Sinus tachycardia with occasional premature ventricular complexes and fusion complexes  Inferior infarct , age undetermined  Abnormal ECG  No previous ECGs available     Blood Gas, Venous    Collection Time: 12/24/22  4:30 PM   Result Value Ref Range    pH, Fabien 7.24 (L) 7.32 - 7.42    pCO2, Fabien 33 (L) 38 - 52 mmHG    pO2, Fabien 33 28 - 48 mmHG    Base Excess 12 (H) 0 - 3.3    HCO3, Venous 14.1 (L) 19 - 25 MMOL/L    O2 Sat, Fabien 51.5 50 - 70 %    Comment VBG    Urinalysis    Collection Time: 12/24/22  5:00 PM   Result Value Ref Range    Color, UA YELLOW YELLOW    Clarity, UA TURBID (A) CLEAR    Glucose, Urine NEGATIVE NEGATIVE MG/DL    Bilirubin Urine NEGATIVE NEGATIVE MG/DL    Ketones, Urine NEGATIVE NEGATIVE MG/DL    Specific Gravity, UA 1.020 1.001 - 1.035    Blood, Urine LARGE (A) NEGATIVE    pH, Urine 6.5 5.0 - 8.0    Protein, UA >500 (HH) NEGATIVE MG/DL    Urobilinogen, Urine NORMAL 0.2 - 1.0 MG/DL    Nitrite Urine, Quantitative NEGATIVE NEGATIVE    Leukocyte Esterase, Urine LARGE (A) NEGATIVE   Culture, Urine    Collection Time: 12/24/22  5:00 PM    Specimen: Urine, clean catch   Result Value Ref Range    Specimen URINE CLEAN CATCH     Special Requests NONE     Culture       Final Report >50,000 CFU/ml Mixed pathogens Multiple organisms isolated, no predominance. Culture indicates probable contamination. Please review colony count and clinical indications to determine if a repeat culture is necessary. No further workup to be done. Microscopic Urinalysis    Collection Time: 12/24/22  5:00 PM   Result Value Ref Range    RBC,  (H) 0 - 3 /HPF    WBC, UA 15006 (H) 0 - 2 /HPF    Bacteria, UA NEGATIVE NEGATIVE /HPF    WBC Clumps, UA MANY /HPF    Trichomonas, UA NONE SEEN NONE SEEN /HPF   COVID-19, Rapid    Collection Time: 12/24/22  6:49 PM    Specimen: Nasopharyngeal   Result Value Ref Range    Source UNKNOWN     SARS-CoV-2, NAAT NOT DETECTED NOT DETECTED   Rapid Flu Swab    Collection Time: 12/24/22  6:49 PM    Specimen: Nasopharyngeal   Result Value Ref Range    Rapid Influenza A Ag NEGATIVE NEGATIVE    Rapid Influenza B Ag NEGATIVE NEGATIVE   POCT Glucose    Collection Time: 12/24/22  6:56 PM   Result Value Ref Range    POC Glucose 189 (H) 70 - 99 MG/DL   POC Blood Glucose    Collection Time: 12/24/22  6:58 PM   Result Value Ref Range    Glucose 189 mg/dL   Clostridium Difficile Toxin/Antigen    Collection Time: 12/24/22  8:00 PM    Specimen: Stool   Result Value Ref Range    Source STOOL     C DIFF AG + TOXIN POSITIVE: C. difficile antigen and toxin detected.  (A) NEGATIVE: NO C. difficile antigen and toxin detected.    POCT Glucose    Collection Time: 12/24/22  9:58 PM   Result Value Ref Range    POC Glucose 215 (H) 70 - 99 MG/DL   Lactic Acid    Collection Time: 12/25/22 12:30 AM   Result Value Ref Range    Lactate 2.3 (HH) 0.5 - 1.9 mMOL/L   Comprehensive Metabolic Panel w/ Reflex to MG    Collection Time: 12/25/22  5:50 AM   Result Value Ref Range    Sodium 135 135 - 145 MMOL/L    Potassium 4.0 3.5 - 5.1 MMOL/L    Chloride 95 (L) 99 - 110 mMol/L    CO2 14 (L) 21 - 32 MMOL/L    BUN 66 (H) 6 - 23 MG/DL    Creatinine 5.2 (H) 0.9 - 1.3 MG/DL    Est, Glom Filt Rate 11 (L) >60 mL/min/1.73m2    Glucose 186 (H) 70 - 99 MG/DL    Calcium 7.2 (L) 8.3 - 10.6 MG/DL    Albumin 2.1 (L) 3.4 - 5.0 GM/DL    Total Protein 5.0 (L) 6.4 - 8.2 GM/DL    Total Bilirubin 0.2 0.0 - 1.0 MG/DL    ALT 19 10 - 40 U/L    AST 32 15 - 37 IU/L    Alkaline Phosphatase 94 40 - 129 IU/L    Anion Gap 26 (H) 4 - 16   CBC with Auto Differential    Collection Time: 12/25/22  5:50 AM   Result Value Ref Range    WBC 56.2 (HH) 4.0 - 10.5 K/CU MM    RBC 3.88 (L) 4.6 - 6.2 M/CU MM    Hemoglobin 11.9 (L) 13.5 - 18.0 GM/DL    Hematocrit 37.6 (L) 42 - 52 %    MCV 96.9 78 - 100 FL    MCH 30.7 27 - 31 PG    MCHC 31.6 (L) 32.0 - 36.0 %    RDW 16.9 (H) 11.7 - 14.9 %    Platelets 704 779 - 096 K/CU MM    MPV 10.2 7.5 - 11.1 FL    Promyelocytes Percent 1 (HH) 0.0 %    Myelocyte Percent 1 (H) 0.0 %    Metamyelocytes Relative 3 (H) 0.0 %    Bands Relative 22 (H) 5 - 11 %    Segs Relative 50.0 36 - 66 %    Lymphocytes % 11.0 (L) 24 - 44 %    Monocytes % 12.0 (H) 0 - 4 %    Promyelocytes Absolute 0.56 K/CU MM    Myelocytes Absolute 0.56 K/CU MM    Metamyelocytes Absolute 1.69 K/CU MM    Bands Absolute 12.36 K/CU MM    Segs Absolute 28.1 K/CU MM    Lymphocytes Absolute 6.2 K/CU MM    Monocytes Absolute 6.7 K/CU MM    Differential Type MANUAL DIFFERENTIAL     WBC Morphology VACUOLATED NEUTROPHILS (SEGS)     PLT Morphology LARGE    POCT Glucose    Collection Time: 12/25/22 8:12 AM   Result Value Ref Range    POC Glucose 180 (H) 70 - 99 MG/DL   Lactic Acid    Collection Time: 12/25/22 11:20 AM   Result Value Ref Range    Lactate 2.9 (HH) 0.5 - 1.9 mMOL/L   Basic Metabolic Panel    Collection Time: 12/25/22 11:20 AM   Result Value Ref Range    Sodium 136 135 - 145 MMOL/L    Potassium 3.9 3.5 - 5.1 MMOL/L    Chloride 96 (L) 99 - 110 mMol/L    CO2 15 (L) 21 - 32 MMOL/L    Anion Gap 25 (H) 4 - 16    BUN 65 (H) 6 - 23 MG/DL    Creatinine 4.6 (H) 0.9 - 1.3 MG/DL    Est, Glom Filt Rate 12 (L) >60 mL/min/1.73m2    Glucose 177 (H) 70 - 99 MG/DL    Calcium 6.8 (LL) 8.3 - 10.6 MG/DL   POCT Glucose    Collection Time: 12/25/22 11:36 AM   Result Value Ref Range    POC Glucose 185 (H) 70 - 99 MG/DL        Imaging/Diagnostics Last 24 Hours   CT ABDOMEN PELVIS WO CONTRAST Additional Contrast? None    Result Date: 12/24/2022  EXAMINATION: CT OF THE ABDOMEN AND PELVIS WITHOUT CONTRAST 12/24/2022 6:48 pm TECHNIQUE: CT of the abdomen and pelvis was performed without the administration of intravenous contrast. Multiplanar reformatted images are provided for review. Automated exposure control, iterative reconstruction, and/or weight based adjustment of the mA/kV was utilized to reduce the radiation dose to as low as reasonably achievable. COMPARISON: CT 12/12/2017 HISTORY: ORDERING SYSTEM PROVIDED HISTORY: ams TECHNOLOGIST PROVIDED HISTORY: Reason for exam:->ams Additional Contrast?->None Decision Support Exception - unselect if not a suspected or confirmed emergency medical condition->Emergency Medical Condition (MA) Reason for Exam: ams; unresponsive Relevant Medical/Surgical History: none FINDINGS: Lower Chest: Mild bibasilar atelectasis. Organs: Limited evaluation of the intra-abdominal organs given the lack of intravenous contrast.  Within this limitation, the liver, spleen, pancreas, and adrenal glands demonstrate no acute abnormality. There is cholelithiasis.   There are bilateral double-J ureteral stents. The proximal loop of the left ureteral stent is at the level of the left UPJ. There are foci of air in the left renal collecting system and in the left ureter. GI/Bowel: Circumferential wall thickening/edema is seen involving a large portion of the colon. No bowel obstruction is seen. No definitive findings of acute appendicitis are seen. Pelvis: There is a Washington catheter in the bladder along with bilateral ureteral stents. There is air in the bladder. Peritoneum/Retroperitoneum: There is a 4.1 cm infrarenal abdominal aortic aneurysm. No lymphadenopathy is seen. No intraperitoneal free air. There is small volume free fluid. Bones/Soft Tissues: Postsurgical changes are seen from posterior lumbar spinal fusion. No acute bony abnormality is seen. There is also hardware in the right proximal femur. Diffuse colonic wall thickening/edema compatible with a nonspecific colitis. No bowel obstruction is seen. There are bilateral ureteral stents along with a Washington catheter. There is air in the left renal collecting system, left ureter, and in the urinary bladder. Correlate for signs of a urinary tract infection. Cholelithiasis. XR ABDOMEN (KUB) (SINGLE AP VIEW)    Result Date: 12/25/2022  EXAMINATION: ONE SUPINE XRAY VIEW(S) OF THE ABDOMEN 12/25/2022 8:16 am COMPARISON: 06/02/2010. HISTORY: ORDERING SYSTEM PROVIDED HISTORY: NG placement TECHNOLOGIST PROVIDED HISTORY: Reason for exam:->NG placement Reason for Exam: NG placement Initial evaluation. FINDINGS: An enteric tube is seen coursing below the diaphragm. The tip is seen in the region the gastric fundus with the side port in the region of the gastric cardia. Nonspecific bowel gas pattern. Bilateral ureteral stents. Lower lumbar spine fusion hardware. Enteric tube as above.      CT HEAD WO CONTRAST    Result Date: 12/24/2022  EXAMINATION: CT OF THE HEAD WITHOUT CONTRAST  12/24/2022 6:48 pm TECHNIQUE: CT of the head was performed without the administration of intravenous contrast. Automated exposure control, iterative reconstruction, and/or weight based adjustment of the mA/kV was utilized to reduce the radiation dose to as low as reasonably achievable. COMPARISON: 10/12/2020 HISTORY: ORDERING SYSTEM PROVIDED HISTORY: HEAD TRAUMA, CLOSED, MILD, GCS >= 13, NO RISK FACTORS, NEURO EXAM NORMAL TECHNOLOGIST PROVIDED HISTORY: Has a \"code stroke\" or \"stroke alert\" been called? ->No Reason for exam:->AMS Decision Support Exception - unselect if not a suspected or confirmed emergency medical condition->Emergency Medical Condition (MA) Reason for Exam: AMS, Head trauma, closed, mild, GCS >= 13, no risk factors, neuro exam normal Relevant Medical/Surgical History: none FINDINGS: BRAIN/VENTRICLES: There is no acute intracranial hemorrhage, mass effect or midline shift. No abnormal extra-axial fluid collection. The gray-white differentiation is maintained without evidence of an acute infarct. There is no evidence of hydrocephalus. Mild chronic white matter ischemic changes. New small focus of mesial right occipital encephalomalacia. ORBITS: The visualized portion of the orbits demonstrate no acute abnormality. SINUSES: The visualized paranasal sinuses and mastoid air cells demonstrate no acute abnormality. SOFT TISSUES/SKULL:  No acute calvarial fracture or focal soft tissue swelling. Age-indeterminate bilateral nasal bone fractures. 1. No acute intracranial abnormality. 2. Stable mild chronic white matter microvascular ischemic changes. 3. New small focus of mesial right occipital encephalomalacia in keeping with sequela of infarct that has occurred since 10/12/2020. 4. Age-indeterminate bilateral nasal bone fractures. XR CHEST PORTABLE    Result Date: 12/24/2022  EXAMINATION: ONE XRAY VIEW OF THE CHEST 12/24/2022 5:26 pm COMPARISON: None.  HISTORY: ORDERING SYSTEM PROVIDED HISTORY: dyspnea TECHNOLOGIST PROVIDED HISTORY: Reason for exam:->dyspnea Reason for Exam: dyspnea FINDINGS: A right chest port terminates in the right atrium. The cardiomediastinal silhouette is unchanged. No pneumothorax, vascular congestion, consolidation, or pleural effusion is identified. No acute osseous abnormality. No acute process.        Electronically signed by Gloria Patricia MD, MD on 12/25/2022 at 12:45 PM

## 2022-12-25 NOTE — ED NOTES
ED TO INPATIENT SBAR HANDOFF    Patient Name: Chaim Garrison   :  1945  76 y.o. MRN:  [id number]  Preferred Name    ED Room #:  TR03/03TR-03  Family/Caregiver Present yes   Restraints no   Sitter no   Sepsis Risk Score Sepsis Risk Score: 7.74    Situation  Code Status: DNR-CCA No additional code details. Allergies: Oxycontin [oxycodone hcl] and Penicillins  Weight: No data found. Arrived from: nursing home  Chief Complaint:   Chief Complaint   Patient presents with    Other     unresponsive     Hospital Problem/Diagnosis:  Principal Problem:    Septic shock ([facility]  Resolved Problems:    * No resolved hospital problems. *    Imaging:   CT ABDOMEN PELVIS WO CONTRAST Additional Contrast? None   Preliminary Result   Diffuse colonic wall thickening/edema compatible with a nonspecific colitis. No bowel obstruction is seen. There are bilateral ureteral stents along with a Washington catheter. There is   air in the left renal collecting system, left ureter, and in the urinary   bladder. Correlate for signs of a urinary tract infection. Cholelithiasis. CT HEAD WO CONTRAST   Final Result   1. No acute intracranial abnormality. 2. Stable mild chronic white matter microvascular ischemic changes. 3. New small focus of mesial right occipital encephalomalacia in keeping with   sequela of infarct that has occurred since 10/12/2020.   4. Age-indeterminate bilateral nasal bone fractures. XR CHEST PORTABLE   Final Result   No acute process.            Abnormal labs:   Abnormal Labs Reviewed   CBC WITH AUTO DIFFERENTIAL - Abnormal; Notable for the following components:       Result Value    WBC 40.2 (*)     RBC 3.85 (*)     Hemoglobin 11.7 (*)     Hematocrit 37.9 (*)     MCHC 30.9 (*)     RDW 17.1 (*)     Bands Relative 3 (*)     Segs Relative 77.0 (*)     Lymphocytes % 12.0 (*)     Monocytes % 8.0 (*)     All other components within normal limits   COMPREHENSIVE METABOLIC PANEL - Abnormal; Notable for the following components:    Sodium 132 (*)     Chloride 92 (*)     CO2 15 (*)     BUN 70 (*)     Creatinine 5.5 (*)     Est, Glom Filt Rate 10 (*)     Glucose 139 (*)     Albumin 2.6 (*)     Anion Gap 25 (*)     All other components within normal limits   LIPASE - Abnormal; Notable for the following components:    Lipase 85 (*)     All other components within normal limits   LACTIC ACID - Abnormal; Notable for the following components:    Lactate 5.8 (*)     All other components within normal limits   PROTIME/INR & PTT - Abnormal; Notable for the following components:    aPTT 25.0 (*)     All other components within normal limits   URINALYSIS - Abnormal; Notable for the following components:    Clarity, UA TURBID (*)     Blood, Urine LARGE (*)     Protein, UA >500 (*)     Leukocyte Esterase, Urine LARGE (*)     All other components within normal limits   BLOOD GAS, VENOUS - Abnormal; Notable for the following components:    pH, Fabien 7.24 (*)     pCO2, Fabien 33 (*)     Base Excess 12 (*)     HCO3, Venous 14.1 (*)     All other components within normal limits   CK - Abnormal; Notable for the following components:     Total  (*)     All other components within normal limits   MAGNESIUM - Abnormal; Notable for the following components:    Magnesium 2.5 (*)     All other components within normal limits   TSH - Abnormal; Notable for the following components:    TSH, High Sensitivity 6.080 (*)     All other components within normal limits   MICROSCOPIC URINALYSIS - Abnormal; Notable for the following components:    RBC,  (*)     WBC, UA 75000 (*)     All other components within normal limits   POCT GLUCOSE - Abnormal; Notable for the following components:    POC Glucose 189 (*)     All other components within normal limits     Critical values: yes     Abnormal Assessment Findings:     Background  History:   Past Medical History:   Diagnosis Date    Arthritis     Cancer (HCC)     hx bladder cancer- dx 2009- following with Dr Denton White Chronic back pain     \"on Tramadol- had back surgery in the past\"    Diabetes mellitus ([address]     [id number]    History of blood transfusion     \"in 2008\"    History of kidney stones     History of motion sickness     Hyperlipidemia     Hypertension     PONV (postoperative nausea and vomiting)     Sleep apnea     sleep study 2014-\"could not tolerate the cpap machine\"       Assessment    Vitals/MEWS: MEWS Score: 5  Level of Consciousness: Responds to pain (2)   Vitals:    12/24/22 1935 12/24/22 1945 12/24/22 1950 12/24/22 2024   BP: 112/69 114/76 111/80 (!) 79/57   Pulse: 96 96 98 100   Resp: 11 14 14 17   Temp:    97.7 °F (36.5 °C)   TempSrc:    Axillary   SpO2:    99%     FiO2 (%): nasal cannula for respiratory distress  O2 Flow Rate: O2 Device: Nasal cannula O2 Flow Rate (L/min): 4 L/min  Cardiac Rhythm:    Pain Assessment:  [] Verbal [x] Altamese Jakob Scale  Pain Scale:    Last documented pain score (0-10 scale)    Last documented pain medication administered:   Mental Status: disoriented  NIH Score:    C-SSRS:    Bedside swallow:    Tung Coma Scale (GCS): Active LDA's:   Peripheral IV 12/24/22 Proximal;Right Forearm (Active)     PO Status: Nothing by Mouth  Pertinent or High Risk Medications/Drips: yes   o If Yes, please provide details: Levophed  Pending Blood Product Administration: no     You may also review the ED PT Care Timeline found under the Summary Nursing Index tab. Recommendation    Pending orders   Plan for Discharge (if known):    Additional Comments:    If any further questions, please call Sending RN at [facility] [address] [place] [address]    Electronically signed by: Electronically signed by Francene Peabody, RN on 12/24/2022 at 8:26 PM       Francene Peabody, RN  12/24/22 2028

## 2022-12-25 NOTE — PROGRESS NOTES
4 Eyes Skin Assessment     NAME:  Arturo Flores  YOB: 1945  MEDICAL RECORD NUMBER:  [id number]    The patient is being assessed for  Admission    I agree that two RNs have performed a thorough Head to Toe Skin Assessment on the patient. ALL assessment sites listed below have been assessed. Areas assessed by both nurses:    Sacrum. Buttock, Coccyx, Ischium        Does the Patient have a Wound? Yes wound(s) were present on assessment.  LDA wound assessment was Initiated and completed by RN       Kyle Prevention initiated by RN: Yes   Wound Care Orders initiated by RN: Yes    Pressure Injury (Stage 3,4, Unstageable, DTI, NWPT, and Complex wounds) if present place referral order by RN under : Yes    New and Established Ostomies, if present place, referral order under : No      Nurse 1 eSignature: Electronically signed by Samson Lara RN on 12/24/22 at 9:09 PM EST      **SHARE this note so that the co-signing nurse is able to place an eSignature**    Nurse 2 eSignature: Electronically signed by Tacos Painter RN on 12/25/22 at 5:19 AM EST

## 2022-12-25 NOTE — ED NOTES
Report received from [facility]. Assumed care @ this time.       Eren Kohler RN  12/24/22 2008

## 2022-12-25 NOTE — H&P
History and Physical      Name:  Gerry Elaine /Age/Sex: 1945  (76 y.o. male)   MRN & CSN:  [id number] & [id number] Encounter Date/Time: 2022 7:50 PM EST   Location:  [place] [address] PCP: Yunior Linebony Day: 1    Assessment and Plan:     #. Severe sepsis with septic shock  -Patient received 3 L fluid bolus, started on Levophed. -WBC 40.2, lactic acid 5.8, hypotensive requiring pressors, tachycardia. -Blood cultures done in ED  -UA-turbid, large leukocyte esterase, nitrate negative, WBC 16403, many WBC clumps, bacteria negative  -Chest x-ray-no acute process  -CT abdomen/pelvis-diffuse colonic wall thickening/edema compatible with a nonspecific colitis.  -Check C. Difficile  -Patient received IV vancomycin, meropenem.  -Cefepime, metronidazole, p.o. vancomycin ordered    #. Acute metabolic encephalopathy-secondary to above    #. CORNELIA on CKD  -BUN/creatinine 29/2 (2022), 70/5.5 today. -CT abdomen/pelvis-bilateral ureteral stents, air in the left renal collecting system, left ureter and in the urinary bladder.  -As per care everywhere urine culture-11/10/2022-Citrobacter freundii  10/28/2022-E faecalis  -Monitor input, output. #.  Complicated UTI  -Patient has chronic indwelling Washington catheter  -Will exchange Washington catheter and repeat UA  -Continue cefepime    #. Anion gap metabolic acidosis secondary to lactic acidosis and CORNELIA on CKD    #. Mild hypovolemic hyponatremia    #. Admission to [facility]-2022-with right lower quadrant abdominal pain, found to have a cecal mass  #. Admission to [facility]-2022-underwent right-sided colectomy-2022 (moderately differentiated adenocarcinoma)  #. Admission 2022 to [address] for diarrhea-diagnosed with C. difficile colitis was treated with the p.o. vancomycin x10 days  #.   Admission 10/16/2022-closed fracture of right hip-s/p ORIF, had recurrent C. difficile during hospital admission.-Finished Dificid for 2 weeks.  And was discharged on vancomycin taper. #.  History of COVID-19-1/2022    #. History of recurrent bladder cancer  -Initial diagnosis 2010-was under surveillance  -Mitomycin-C bladder instillation-7/2016  -2017 diagnosed with high-grade urothelial cancer  -History of partial cystectomy  -Patient follows with Dr. Jose Eduardo Rico with [address]    #. History of DVT of left lower extremity-6/2018  -Completed anticoagulation therapy    #. History of diverticulosis/diverticular bleed-9/2020    #. Diabetes mellitus type 2  -LuM5y-1.8-1/2022  -Insulin sliding scale with hypoglycemia protocol. #.  Diabetic neuropathy  -On gabapentin    #. Chronic pain-on hydrocodone-acetaminophen every 4 hours as needed    #. URIEL-currently not using CPAP/BiPAP. -As per daughter patient has lost a lot of weight since last year. #.  Depression/anxiety  -Patient is on bupropion, mirtazapine    #. BPH-on tamsulosin. #.  Chronic anemia-on ferrous sulfate daily    Disposition:   Current Living situation: [facility]    Diet Diet NPO   DVT Prophylaxis [] Lovenox, [x]  Heparin, [] SCDs, [] Ambulation,  [] Eliquis, [] Xarelto   Code Status DNR-CCA   Surrogate Decision Maker/ POA      History from:   EMR, patient. History of Present Illness:     Chief Complaint: Septic shock ([facility] [address]  Mirza Fierro is a 76 y.o. male with recurrent bladder cancer, history of cecal cancer s/p right colectomy, recurrent C. difficile, history of LLE DVT, URIEL currently not on CPAP/BiPAP, diabetes mellitus type 2 was brought to ED by EMS for altered mental status. Patient is currently not responsive, only responding to tactile stimulation. Patient resisting when trying to open his eyes, move his upper extremities. Most of the information was from daughter at bedside. She reports that patient was last normal around lunchtime. Patient had Marion adam lunch with his family.   Daughter reported that she had to feed him, but did not appear very active. She denied patient having any fever, chills, nausea, vomiting, denied any abdominal pain. She denied patient having any diarrhea. Daughter reported that she usually visits him every day while going back home from work. At presentation patient was noted to have BP 51/35, , RR 22, temp 98.4, saturating 89% on nonrebreather. Later patient's oxygen saturations improved and was saturating 99% on 4 L of oxygen through nasal cannula. Lab work was significant for WBC 40.2, hemoglobin 11.7, platelets 707, sodium 132, chloride 97, CO2 15, BUN 70, creatinine 5.5, anion gap 25, lactic acid 4.8, random glucose 139,.  UA suggestive of infection. VBG-pH 7.24, PCO2 33, PO2 33, HCO3 14.1. CT head-no acute intracranial abnormality. ,  Chest x-ray-no acute process. CT abdomen/pelvis-diffuse colonic wall thickening/edema compatible with nonspecific colitis, no bowel obstruction, bilateral ureteral stents along with a Washington catheter. Air in the left renal collecting system, left ureter and the urinary bladder. Rapid COVID, influenza a and B-negative. Patient received 3 L NS bolus. Blood cultures, urine culture sent from ED. Albuterol/ipratropium HFA, vancomycin, meropenem. Patient was started on Levophed in ER. Review of Systems: Need 10 Elements   10 point review of systems conducted and pertinent positives and negatives as per HPI.     Objective:   No intake or output data in the 24 hours ending 12/24/22 1950   Vitals:   Vitals:    12/24/22 1905 12/24/22 1915 12/24/22 1920 12/24/22 1925   BP: (!) 86/54 (!) 88/58 (!) 73/47 (!) 77/48   Pulse: 98 96 97 97   Resp: 16 12 (!) 9 15   Temp:       TempSrc:       SpO2:           Medications Prior to Admission   Reviewed medications with patient    As per the medication list sent from [facility] view-patient is on Tylenol, bupropion 150 mg daily, vitamin D3 2000 units daily, vitamin B12 thousand MCG daily, ferrous sulfate 570 mg daily, folic acid 1 mg daily, gabapentin 400 mg 3 times daily, hydrocodone-acetaminophen 5-325 every 4 hours as needed, lactobacillus 3 times daily, mirtazapine 7.5 mg at bedtime, Zofran as needed, Senokot 2 tab daily as needed, tamsulosin 0.4 mg daily. Physical Exam: Need 8 Elements   Physical Exam     GEN  -unresponsive. EYES   -PERRL. HENT  -MM are very dry, poor oral hygiene   RESP  -LS CTA equal bilat, no wheezes, rales or rhonchi. Symmetric chest movement. No respiratory distress noted. C/V  -S1/S2 auscultated, tachycardia without appreciable M/R/G. No JVD or carotid bruits. Peripheral pulses equal bilaterally and palpable. No peripheral edema. No reproducible chest wall tenderness. GI  -Abdomen is soft, non-distended, RLQ tenderness. No masses or guarding. + BS in all quadrants. Rectal exam deferred.   -No CVA tenderness. Washington catheter is present. MS  -B/L extremities - No gross joint deformities. No swelling, intact sensation symmetrical.   SKIN  -Normal coloration, warm, dry. NEURO  -unresponsive, not following any commands. Past Medical History:   Reviewed patient's past medical, surgical, social, family history and allergies. PMHx   Past Medical History:   Diagnosis Date    Arthritis     Cancer ([facility] [address]     hx bladder cancer- dx 2009- following with Dr Denton White Chronic back pain     \"on Tramadol- had back surgery in the past\"    Diabetes mellitus ([facility] [address]     [id number]    History of blood transfusion     \"in 2008\"    History of kidney stones     History of motion sickness     Hyperlipidemia     Hypertension     PONV (postoperative nausea and vomiting)     Sleep apnea     sleep study 2014-\"could not tolerate the cpap machine\"     PSHX:  has a past surgical history that includes Lithotripsy (6/2010(per old chart)); Colonoscopy (2006); shoulder surgery; Varicose vein surgery; knee surgery (Right, 1989); Cystocopy (1993);  Cystoscopy (2014); back surgery; joint replacement; eye surgery (); and Cystocopy (2016). Allergies: Allergies   Allergen Reactions    Oxycontin [Oxycodone Hcl]      hallucinations    Penicillins Hives     Fam HX: family history includes Diabetes in his father; Heart Disease in his mother.   Soc HX:   Social History     Socioeconomic History    Marital status:    Tobacco Use    Smoking status: Former     Packs/day: 3.00     Years: 15.00     Pack years: 45.00     Types: Cigarettes     Quit date: 1985     Years since quittin.9    Smokeless tobacco: Never   Substance and Sexual Activity    Alcohol use: No    Drug use: No       Medications:   Medications:    sodium chloride flush  5-40 mL IntraVENous 2 times per day    famotidine (PEPCID) injection  20 mg IntraVENous Daily    cefepime  1,000 mg IntraVENous Q12H    metroNIDAZOLE  500 mg IntraVENous Q8H    vancomycin  250 mg Oral Q6H      Infusions:    norepinephrine 10 mcg/min (22)    sodium chloride      norepinephrine      vasopressin      lactated ringers       PRN Meds: sodium chloride flush, 5-40 mL, PRN  sodium chloride, , PRN  ondansetron, 4 mg, Q8H PRN   Or  ondansetron, 4 mg, Q6H PRN  acetaminophen, 650 mg, Q6H PRN   Or  acetaminophen, 650 mg, Q6H PRN        Labs      CBC:   Recent Labs     22  1616   WBC 40.2*   HGB 11.7*        BMP:    Recent Labs     22  1616 22  1858   *  --    K 4.6  --    CL 92*  --    CO2 15*  --    BUN 70*  --    CREATININE 5.5*  --    GLUCOSE 139* 189     Hepatic:   Recent Labs     22  1616   AST 30   ALT 16   BILITOT 0.4   ALKPHOS 107     Lipids:   Lab Results   Component Value Date/Time    CHOL 193 2016 08:53 AM    HDL 32 2016 08:53 AM    TRIG 236 2016 08:53 AM     Hemoglobin A1C:   Lab Results   Component Value Date/Time    LABA1C 6.4 2016 08:53 AM     TSH: No results found for: TSH  Troponin: No results found for: TROPONINT  Lactic Acid: No results for input(s): LACTA in the last 72 hours. BNP: No results for input(s): PROBNP in the last 72 hours. UA:  Lab Results   Component Value Date/Time    NITRU NEGATIVE 12/24/2022 05:00 PM    COLORU YELLOW 12/24/2022 05:00 PM    WBCUA 97697 12/24/2022 05:00 PM    RBCUA 823 12/24/2022 05:00 PM    MUCUS RARE 09/29/2014 05:15 AM    TRICHOMONAS NONE SEEN 12/24/2022 05:00 PM    BACTERIA NEGATIVE 12/24/2022 05:00 PM    CLARITYU TURBID 12/24/2022 05:00 PM    SPECGRAV 1.020 12/24/2022 05:00 PM    LEUKOCYTESUR LARGE 12/24/2022 05:00 PM    UROBILINOGEN NORMAL 12/24/2022 05:00 PM    BILIRUBINUR NEGATIVE 12/24/2022 05:00 PM    BLOODU LARGE 12/24/2022 05:00 PM    KETUA NEGATIVE 12/24/2022 05:00 PM     Urine Cultures: No results found for: LABURIN  Blood Cultures: No results found for: BC  No results found for: BLOODCULT2  Organism:   Lab Results   Component Value Date/Time    ORG ENTBC 09/14/2014 03:00 PM       Imaging/Diagnostics Last 24 Hours   CT ABDOMEN PELVIS WO CONTRAST Additional Contrast? None    Result Date: 12/24/2022  EXAMINATION: CT OF THE ABDOMEN AND PELVIS WITHOUT CONTRAST 12/24/2022 6:48 pm TECHNIQUE: CT of the abdomen and pelvis was performed without the administration of intravenous contrast. Multiplanar reformatted images are provided for review. Automated exposure control, iterative reconstruction, and/or weight based adjustment of the mA/kV was utilized to reduce the radiation dose to as low as reasonably achievable. COMPARISON: CT 12/12/2017 HISTORY: ORDERING SYSTEM PROVIDED HISTORY: ams TECHNOLOGIST PROVIDED HISTORY: Reason for exam:->ams Additional Contrast?->None Decision Support Exception - unselect if not a suspected or confirmed emergency medical condition->Emergency Medical Condition (MA) Reason for Exam: ams; unresponsive Relevant Medical/Surgical History: none FINDINGS: Lower Chest: Mild bibasilar atelectasis.  Organs: Limited evaluation of the intra-abdominal organs given the lack of intravenous contrast.  Within this limitation, the liver, spleen, pancreas, and adrenal glands demonstrate no acute abnormality. There is cholelithiasis. There are bilateral double-J ureteral stents. The proximal loop of the left ureteral stent is at the level of the left UPJ. There are foci of air in the left renal collecting system and in the left ureter. GI/Bowel: Circumferential wall thickening/edema is seen involving a large portion of the colon. No bowel obstruction is seen. No definitive findings of acute appendicitis are seen. Pelvis: There is a Washington catheter in the bladder along with bilateral ureteral stents. There is air in the bladder. Peritoneum/Retroperitoneum: There is a 4.1 cm infrarenal abdominal aortic aneurysm. No lymphadenopathy is seen. No intraperitoneal free air. There is small volume free fluid. Bones/Soft Tissues: Postsurgical changes are seen from posterior lumbar spinal fusion. No acute bony abnormality is seen. There is also hardware in the right proximal femur. Diffuse colonic wall thickening/edema compatible with a nonspecific colitis. No bowel obstruction is seen. There are bilateral ureteral stents along with a Washington catheter. There is air in the left renal collecting system, left ureter, and in the urinary bladder. Correlate for signs of a urinary tract infection. Cholelithiasis. CT HEAD WO CONTRAST    Result Date: 12/24/2022  EXAMINATION: CT OF THE HEAD WITHOUT CONTRAST  12/24/2022 6:48 pm TECHNIQUE: CT of the head was performed without the administration of intravenous contrast. Automated exposure control, iterative reconstruction, and/or weight based adjustment of the mA/kV was utilized to reduce the radiation dose to as low as reasonably achievable. COMPARISON: 10/12/2020 HISTORY: ORDERING SYSTEM PROVIDED HISTORY: HEAD TRAUMA, CLOSED, MILD, GCS >= 13, NO RISK FACTORS, NEURO EXAM NORMAL TECHNOLOGIST PROVIDED HISTORY: Has a \"code stroke\" or \"stroke alert\" been called? ->No Reason for exam:->AMS Decision Support Exception - unselect if not a suspected or confirmed emergency medical condition->Emergency Medical Condition (MA) Reason for Exam: AMS, Head trauma, closed, mild, GCS >= 13, no risk factors, neuro exam normal Relevant Medical/Surgical History: none FINDINGS: BRAIN/VENTRICLES: There is no acute intracranial hemorrhage, mass effect or midline shift. No abnormal extra-axial fluid collection. The gray-white differentiation is maintained without evidence of an acute infarct. There is no evidence of hydrocephalus. Mild chronic white matter ischemic changes. New small focus of mesial right occipital encephalomalacia. ORBITS: The visualized portion of the orbits demonstrate no acute abnormality. SINUSES: The visualized paranasal sinuses and mastoid air cells demonstrate no acute abnormality. SOFT TISSUES/SKULL:  No acute calvarial fracture or focal soft tissue swelling. Age-indeterminate bilateral nasal bone fractures. 1. No acute intracranial abnormality. 2. Stable mild chronic white matter microvascular ischemic changes. 3. New small focus of mesial right occipital encephalomalacia in keeping with sequela of infarct that has occurred since 10/12/2020. 4. Age-indeterminate bilateral nasal bone fractures. XR CHEST PORTABLE    Result Date: 12/24/2022  EXAMINATION: ONE XRAY VIEW OF THE CHEST 12/24/2022 5:26 pm COMPARISON: None. HISTORY: ORDERING SYSTEM PROVIDED HISTORY: dyspnea TECHNOLOGIST PROVIDED HISTORY: Reason for exam:->dyspnea Reason for Exam: dyspnea FINDINGS: A right chest port terminates in the right atrium. The cardiomediastinal silhouette is unchanged. No pneumothorax, vascular congestion, consolidation, or pleural effusion is identified. No acute osseous abnormality. No acute process. Personally reviewed Lab Studies, Imaging, and discussed case with ED physician.     Electronically signed by Adilson Flores MD on 12/24/2022 at 7:50 PM

## 2022-12-25 NOTE — CONSULTS
[address] [facility]  consulted by REJI Scruggs for monitoring and adjustment. Indication for treatment: Vancomycin indication: Sepsis unknown source likely secondary to Urosepsis  Goal trough: Trough Goal: 15-20 mcg/mL  AUC/MARY: 400-600    Risk Factors for MRSA Identified:   Hospitalization within the past 90 days    Pertinent Laboratory Values:   Temp Readings from Last 3 Encounters:   12/25/22 98.4 °F (36.9 °C) (Oral)   03/29/22 97.3 °F (36.3 °C) (Temporal)   02/28/22 96.6 °F (35.9 °C) (Temporal)     Recent Labs     12/24/22  1616 12/25/22  0030 12/25/22  0550 12/25/22  1120   WBC 40.2*  --  56.2*  --    LACTATE 5.8* 2.3*  --  2.9*     Recent Labs     12/24/22  1616 12/25/22  0550 12/25/22  1120   BUN 70* 66* 65*   CREATININE 5.5* 5.2* 4.6*     Estimated Creatinine Clearance: 12 mL/min (A) (based on SCr of 4.6 mg/dL (H)). Intake/Output Summary (Last 24 hours) at 12/25/2022 1440  Last data filed at 12/25/2022 0703  Gross per 24 hour   Intake --   Output 60 ml   Net -60 ml       Pertinent Cultures:   Date    Source    Results  12/24   Blood (4 of 4)                        MRSA     12/24   Urine                                       No further workup    12/24   C-Diff                                      Positive         Vancomycin level:   TROUGH:  No results for input(s): VANCOTROUGH in the last 72 hours. RANDOM:  No results for input(s): VANCORANDOM in the last 72 hours. Assessment:  HPI: Yaw Liriano is a 76 y.o. male with pmh of history of recurrent bladder cancer, history of DVT, diverticulosis, diabetes complicated with diabetic neuropathy, URIEL, depression, BPH who presents with Septic shock ([facility] [address] MRSA in the blood (4 of 4 positive). Pt also noted as C-Diff positive and also ordered on vancomycin po for treatment.   SCr, BUN, and urine output: CORNELIA on CKD, SCR very elevated @5.5 on admission and down to 4.6 today, limited UOP data  Day(s) of therapy: 1 (to be determined)  Vancomycin concentration:   12/26 - random @06:00    Plan:  Due to CORNELIA on CKD with very elevated SCR, continue with intermittent dosing based on vanco levels and renal trends. The patient received vancomycin 2000mg ivpb x1 dose yesterday, no vancomycin dose to be given today. Check the vanco level tomorrow am  Pharmacy will continue to monitor patient and adjust therapy as indicated    [address] 12/26 @06:00    Thank you for the consult. Rich Dominique, [facility]  12/25/2022 2:40 PM

## 2022-12-25 NOTE — PROGRESS NOTES
V2.0  [facility] [place]      Name:  Andrew Cedeño /Age/Sex: 1945  (76 y.o. male)   MRN & CSN:  [id number] & [id number] Encounter Date/Time: 2022 8:53 AM EST    Location:  -A PCP: Yunior Cintron Day: 2    Assessment and Plan:   Andrew Cedeño is a 76 y.o. male with pmh of  recurrent bladder cancer, history of cecal cancer s/p right colectomy, recurrent C. difficile, history of LLE DVT, diabetes admitted with Septic shock ([facility] [address] 2/2 possible cdiff vs UTI    Septic shock + hypovolemic shock  Possible Cdiff. Possible UTI  Leukocytosis       Neuro - lethargic, maintaining airway. Not on sedation    Resp - on NC, maintaining O2 sats    CV - on levo and vaso. Will give trial of fluids, possibly albumin for intravascular volume depletion. If still tachycardic after vloume resuscitaion, will switch levo to abiel. GI - NPO while on 2 pressors. Protonix.  - CORNELIA - pre-renal + ATN. No indication for urgent HD. Check urine lytes. Repeat BMP after fluid resus. Continue HCO3 gtt for now. ID - CT with diffuse colitis, cdfiff pending. continue empiric treatment for possible fulminant cdiff with Fidoximicin (PO Vanc not able to be given via NGT per pharmacy.) and IV flagyl. Will continue Cefepime and likely redose IV Vanc as source unclear. Follow up stool PCR. Heme - HSQ          Diet Diet NPO  ADULT ORAL NUTRITION SUPPLEMENT; HS Snack; Standard High Calorie/High Protein Oral Supplement   DVT Prophylaxis [] Lovenox, [x]  Heparin, [] SCDs, [] Ambulation,  [] Eliquis, [] Xarelto  [] Coumadin   GI Prophylaxis [x] Yes          [] No   Code Status DNR-CCA    Disposition Patient requires continued ICU care due to septic shock     Subjective:     Admitted with shock )septic + hypovolemic). Cdiff pending. On 2 pressors. Getting volume. On empiric abx. Lethargic and not following. Protecting airway  Discussion with daughter at bedside yesterday - Akosua Pop is PoA. Living will [facility] but if his condition continues to worsen then they would likely not want to continue aggressive care. Review of Systems:    Review of Systems    Unable to assess    Objective: Intake/Output Summary (Last 24 hours) at 12/25/2022 0853  Last data filed at 12/25/2022 0703  Gross per 24 hour   Intake --   Output 60 ml   Net -60 ml        Vitals:   Vitals:    12/25/22 0700   BP: (!) 91/55   Pulse: (!) 105   Resp: 16   Temp:    SpO2: 99%       Physical Exam:     General: lethargic  Eyes: pupils reactive  ENT: neck supple  Cardiovascular: tachy  Respiratory: Clear to auscultation  Gastrointestinal: Soft, diffuse tenderness   Genitourinary: no suprapubic tenderness  Musculoskeletal: No edema  Skin:  dry  Neuro: lethargic.       Medications:   Medications:    cefepime  500 mg IntraVENous Q24H    sodium chloride  500 mL IntraVENous Once    sodium bicarbonate  50 mEq IntraVENous Once    sodium bicarbonate  50 mEq IntraVENous Once    hydrocortisone sodium succinate PF  50 mg IntraVENous Q6H    sodium chloride flush  5-40 mL IntraVENous 2 times per day    famotidine (PEPCID) injection  20 mg IntraVENous Daily    metroNIDAZOLE  500 mg IntraVENous Q8H    vancomycin  250 mg Oral Q6H    insulin lispro  0-8 Units SubCUTAneous TID WC    insulin lispro  0-4 Units SubCUTAneous Nightly      Infusions:    sodium chloride      norepinephrine 15 mcg/min (12/25/22 0315)    vasopressin 0.03 Units/min (12/25/22 0621)    sodium bicarbonate infusion 125 mL/hr at 12/25/22 0808     PRN Meds: sodium chloride flush, 5-40 mL, PRN  sodium chloride, , PRN  ondansetron, 4 mg, Q8H PRN   Or  ondansetron, 4 mg, Q6H PRN  acetaminophen, 650 mg, Q6H PRN   Or  acetaminophen, 650 mg, Q6H PRN        Labs      Recent Results (from the past 24 hour(s))   CBC with Auto Differential    Collection Time: 12/24/22  4:16 PM   Result Value Ref Range    WBC 40.2 (HH) 4.0 - 10.5 K/CU MM    RBC 3.85 (L) 4.6 - 6.2 M/CU MM    Hemoglobin 11.7 (L) 13.5 - 18.0 GM/DL    Hematocrit 37.9 (L) 42 - 52 %    MCV 98.4 78 - 100 FL    MCH 30.4 27 - 31 PG    MCHC 30.9 (L) 32.0 - 36.0 %    RDW 17.1 (H) 11.7 - 14.9 %    Platelets 841 725 - 302 K/CU MM    MPV 10.1 7.5 - 11.1 FL    Bands Relative 3 (L) 5 - 11 %    Segs Relative 77.0 (H) 36 - 66 %    Lymphocytes % 12.0 (L) 24 - 44 %    Monocytes % 8.0 (H) 0 - 4 %    Bands Absolute 1.21 K/CU MM    Segs Absolute 31.0 K/CU MM    Lymphocytes Absolute 4.8 K/CU MM    Monocytes Absolute 3.2 K/CU MM    Differential Type MANUAL DIFFERENTIAL     Anisocytosis 1+     Polychromasia 1+     PLT Morphology FEW LARGE PLATELETS    Comprehensive Metabolic Panel    Collection Time: 12/24/22  4:16 PM   Result Value Ref Range    Sodium 132 (L) 135 - 145 MMOL/L    Potassium 4.6 3.5 - 5.1 MMOL/L    Chloride 92 (L) 99 - 110 mMol/L    CO2 15 (L) 21 - 32 MMOL/L    BUN 70 (H) 6 - 23 MG/DL    Creatinine 5.5 (H) 0.9 - 1.3 MG/DL    Est, Glom Filt Rate 10 (L) >60 mL/min/1.73m2    Glucose 139 (H) 70 - 99 MG/DL    Calcium 8.6 8.3 - 10.6 MG/DL    Albumin 2.6 (L) 3.4 - 5.0 GM/DL    Total Protein 6.8 6.4 - 8.2 GM/DL    Total Bilirubin 0.4 0.0 - 1.0 MG/DL    ALT 16 10 - 40 U/L    AST 30 15 - 37 IU/L    Alkaline Phosphatase 107 40 - 129 IU/L    Anion Gap 25 (H) 4 - 16   Lipase    Collection Time: 12/24/22  4:16 PM   Result Value Ref Range    Lipase 85 (H) 13 - 60 IU/L   Lactic Acid    Collection Time: 12/24/22  4:16 PM   Result Value Ref Range    Lactate 5.8 (HH) 0.5 - 1.9 mMOL/L   Protime/INR & PTT    Collection Time: 12/24/22  4:16 PM   Result Value Ref Range    Protime 12.7 11.7 - 14.5 SECONDS    INR 0.98 INDEX    aPTT 25.0 (L) 25.1 - 37.1 SECONDS   CK    Collection Time: 12/24/22  4:16 PM   Result Value Ref Range    Total  (H) 38 - 174 IU/L   Magnesium    Collection Time: 12/24/22  4:16 PM   Result Value Ref Range    Magnesium 2.5 (H) 1.8 - 2.4 mg/dl   TSH    Collection Time: 12/24/22  4:16 PM   Result Value Ref Range    TSH, High Sensitivity 6.080 (H) 0.270 - 4.20 uIu/ml   EKG 12 Lead    Collection Time: 12/24/22  4:19 PM   Result Value Ref Range    Ventricular Rate 122 BPM    Atrial Rate 122 BPM    P-R Interval 152 ms    QRS Duration 102 ms    Q-T Interval 306 ms    QTc Calculation (Bazett) 436 ms    P Axis 34 degrees    R Axis -29 degrees    T Axis 35 degrees    Diagnosis       Sinus tachycardia with occasional premature ventricular complexes and fusion complexes  Inferior infarct , age undetermined  Abnormal ECG  No previous ECGs available     Blood Gas, Venous    Collection Time: 12/24/22  4:30 PM   Result Value Ref Range    pH, Fabien 7.24 (L) 7.32 - 7.42    pCO2, Fabien 33 (L) 38 - 52 mmHG    pO2, Fabien 33 28 - 48 mmHG    Base Excess 12 (H) 0 - 3.3    HCO3, Venous 14.1 (L) 19 - 25 MMOL/L    O2 Sat, Fabien 51.5 50 - 70 %    Comment VBG    Urinalysis    Collection Time: 12/24/22  5:00 PM   Result Value Ref Range    Color, UA YELLOW YELLOW    Clarity, UA TURBID (A) CLEAR    Glucose, Urine NEGATIVE NEGATIVE MG/DL    Bilirubin Urine NEGATIVE NEGATIVE MG/DL    Ketones, Urine NEGATIVE NEGATIVE MG/DL    Specific Gravity, UA 1.020 1.001 - 1.035    Blood, Urine LARGE (A) NEGATIVE    pH, Urine 6.5 5.0 - 8.0    Protein, UA >500 (HH) NEGATIVE MG/DL    Urobilinogen, Urine NORMAL 0.2 - 1.0 MG/DL    Nitrite Urine, Quantitative NEGATIVE NEGATIVE    Leukocyte Esterase, Urine LARGE (A) NEGATIVE   Microscopic Urinalysis    Collection Time: 12/24/22  5:00 PM   Result Value Ref Range    RBC,  (H) 0 - 3 /HPF    WBC, UA 79950 (H) 0 - 2 /HPF    Bacteria, UA NEGATIVE NEGATIVE /HPF    WBC Clumps, UA MANY /HPF    Trichomonas, UA NONE SEEN NONE SEEN /HPF   COVID-19, Rapid    Collection Time: 12/24/22  6:49 PM    Specimen: Nasopharyngeal   Result Value Ref Range    Source UNKNOWN     SARS-CoV-2, NAAT NOT DETECTED NOT DETECTED   Rapid Flu Swab    Collection Time: 12/24/22  6:49 PM    Specimen: Nasopharyngeal   Result Value Ref Range    Rapid Influenza A Ag NEGATIVE NEGATIVE    Rapid Influenza B Ag NEGATIVE NEGATIVE   POCT Glucose    Collection Time: 12/24/22  6:56 PM   Result Value Ref Range    POC Glucose 189 (H) 70 - 99 MG/DL   POC Blood Glucose    Collection Time: 12/24/22  6:58 PM   Result Value Ref Range    Glucose 189 mg/dL   POCT Glucose    Collection Time: 12/24/22  9:58 PM   Result Value Ref Range    POC Glucose 215 (H) 70 - 99 MG/DL   Lactic Acid    Collection Time: 12/25/22 12:30 AM   Result Value Ref Range    Lactate 2.3 (HH) 0.5 - 1.9 mMOL/L   Comprehensive Metabolic Panel w/ Reflex to MG    Collection Time: 12/25/22  5:50 AM   Result Value Ref Range    Sodium 135 135 - 145 MMOL/L    Potassium 4.0 3.5 - 5.1 MMOL/L    Chloride 95 (L) 99 - 110 mMol/L    CO2 14 (L) 21 - 32 MMOL/L    BUN 66 (H) 6 - 23 MG/DL    Creatinine 5.2 (H) 0.9 - 1.3 MG/DL    Est, Glom Filt Rate 11 (L) >60 mL/min/1.73m2    Glucose 186 (H) 70 - 99 MG/DL    Calcium 7.2 (L) 8.3 - 10.6 MG/DL    Albumin 2.1 (L) 3.4 - 5.0 GM/DL    Total Protein 5.0 (L) 6.4 - 8.2 GM/DL    Total Bilirubin 0.2 0.0 - 1.0 MG/DL    ALT 19 10 - 40 U/L    AST 32 15 - 37 IU/L    Alkaline Phosphatase 94 40 - 129 IU/L    Anion Gap 26 (H) 4 - 16   CBC with Auto Differential    Collection Time: 12/25/22  5:50 AM   Result Value Ref Range    WBC 56.2 (HH) 4.0 - 10.5 K/CU MM    RBC 3.88 (L) 4.6 - 6.2 M/CU MM    Hemoglobin 11.9 (L) 13.5 - 18.0 GM/DL    Hematocrit 37.6 (L) 42 - 52 %    MCV 96.9 78 - 100 FL    MCH 30.7 27 - 31 PG    MCHC 31.6 (L) 32.0 - 36.0 %    RDW 16.9 (H) 11.7 - 14.9 %    Platelets 986 556 - 956 K/CU MM    MPV 10.2 7.5 - 11.1 FL    Promyelocytes Percent 1 (HH) 0.0 %    Myelocyte Percent 1 (H) 0.0 %    Metamyelocytes Relative 3 (H) 0.0 %    Bands Relative 22 (H) 5 - 11 %    Segs Relative 50.0 36 - 66 %    Lymphocytes % 11.0 (L) 24 - 44 %    Monocytes % 12.0 (H) 0 - 4 %    Promyelocytes Absolute 0.56 K/CU MM    Myelocytes Absolute 0.56 K/CU MM    Metamyelocytes Absolute 1.69 K/CU MM    Bands Absolute 12.36 K/CU MM    Segs Absolute 28.1 K/CU MM    Lymphocytes Absolute 6.2 K/CU MM    Monocytes Absolute 6.7 K/CU MM    Differential Type MANUAL DIFFERENTIAL     WBC Morphology VACUOLATED NEUTROPHILS (SEGS)     PLT Morphology LARGE    POCT Glucose    Collection Time: 12/25/22  8:12 AM   Result Value Ref Range    POC Glucose 180 (H) 70 - 99 MG/DL        Imaging/Diagnostics Last 24 Hours   CT ABDOMEN PELVIS WO CONTRAST Additional Contrast? None    Result Date: 12/24/2022  EXAMINATION: CT OF THE ABDOMEN AND PELVIS WITHOUT CONTRAST 12/24/2022 6:48 pm TECHNIQUE: CT of the abdomen and pelvis was performed without the administration of intravenous contrast. Multiplanar reformatted images are provided for review. Automated exposure control, iterative reconstruction, and/or weight based adjustment of the mA/kV was utilized to reduce the radiation dose to as low as reasonably achievable. COMPARISON: CT 12/12/2017 HISTORY: ORDERING SYSTEM PROVIDED HISTORY: ams TECHNOLOGIST PROVIDED HISTORY: Reason for exam:->ams Additional Contrast?->None Decision Support Exception - unselect if not a suspected or confirmed emergency medical condition->Emergency Medical Condition (MA) Reason for Exam: ams; unresponsive Relevant Medical/Surgical History: none FINDINGS: Lower Chest: Mild bibasilar atelectasis. Organs: Limited evaluation of the intra-abdominal organs given the lack of intravenous contrast.  Within this limitation, the liver, spleen, pancreas, and adrenal glands demonstrate no acute abnormality. There is cholelithiasis. There are bilateral double-J ureteral stents. The proximal loop of the left ureteral stent is at the level of the left UPJ. There are foci of air in the left renal collecting system and in the left ureter. GI/Bowel: Circumferential wall thickening/edema is seen involving a large portion of the colon. No bowel obstruction is seen. No definitive findings of acute appendicitis are seen. Pelvis:  There is a Washington catheter in the bladder along with bilateral ureteral stents. There is air in the bladder. Peritoneum/Retroperitoneum: There is a 4.1 cm infrarenal abdominal aortic aneurysm. No lymphadenopathy is seen. No intraperitoneal free air. There is small volume free fluid. Bones/Soft Tissues: Postsurgical changes are seen from posterior lumbar spinal fusion. No acute bony abnormality is seen. There is also hardware in the right proximal femur. Diffuse colonic wall thickening/edema compatible with a nonspecific colitis. No bowel obstruction is seen. There are bilateral ureteral stents along with a Washington catheter. There is air in the left renal collecting system, left ureter, and in the urinary bladder. Correlate for signs of a urinary tract infection. Cholelithiasis. CT HEAD WO CONTRAST    Result Date: 12/24/2022  EXAMINATION: CT OF THE HEAD WITHOUT CONTRAST  12/24/2022 6:48 pm TECHNIQUE: CT of the head was performed without the administration of intravenous contrast. Automated exposure control, iterative reconstruction, and/or weight based adjustment of the mA/kV was utilized to reduce the radiation dose to as low as reasonably achievable. COMPARISON: 10/12/2020 HISTORY: ORDERING SYSTEM PROVIDED HISTORY: HEAD TRAUMA, CLOSED, MILD, GCS >= 13, NO RISK FACTORS, NEURO EXAM NORMAL TECHNOLOGIST PROVIDED HISTORY: Has a \"code stroke\" or \"stroke alert\" been called? ->No Reason for exam:->AMS Decision Support Exception - unselect if not a suspected or confirmed emergency medical condition->Emergency Medical Condition (MA) Reason for Exam: AMS, Head trauma, closed, mild, GCS >= 13, no risk factors, neuro exam normal Relevant Medical/Surgical History: none FINDINGS: BRAIN/VENTRICLES: There is no acute intracranial hemorrhage, mass effect or midline shift. No abnormal extra-axial fluid collection. The gray-white differentiation is maintained without evidence of an acute infarct. There is no evidence of hydrocephalus.  Mild chronic white matter ischemic changes. New small focus of mesial right occipital encephalomalacia. ORBITS: The visualized portion of the orbits demonstrate no acute abnormality. SINUSES: The visualized paranasal sinuses and mastoid air cells demonstrate no acute abnormality. SOFT TISSUES/SKULL:  No acute calvarial fracture or focal soft tissue swelling. Age-indeterminate bilateral nasal bone fractures. 1. No acute intracranial abnormality. 2. Stable mild chronic white matter microvascular ischemic changes. 3. New small focus of mesial right occipital encephalomalacia in keeping with sequela of infarct that has occurred since 10/12/2020. 4. Age-indeterminate bilateral nasal bone fractures. XR CHEST PORTABLE    Result Date: 12/24/2022  EXAMINATION: ONE XRAY VIEW OF THE CHEST 12/24/2022 5:26 pm COMPARISON: None. HISTORY: ORDERING SYSTEM PROVIDED HISTORY: dyspnea TECHNOLOGIST PROVIDED HISTORY: Reason for exam:->dyspnea Reason for Exam: dyspnea FINDINGS: A right chest port terminates in the right atrium. The cardiomediastinal silhouette is unchanged. No pneumothorax, vascular congestion, consolidation, or pleural effusion is identified. No acute osseous abnormality. No acute process.        Time spent 38 mins    Electronically signed by Sujatha Hearn MD on 12/25/2022 at 8:53 AM

## 2022-12-26 VITALS
BODY MASS INDEX: 20.75 KG/M2 | TEMPERATURE: 98.9 F | DIASTOLIC BLOOD PRESSURE: 33 MMHG | OXYGEN SATURATION: 100 % | WEIGHT: 144.62 LBS | SYSTOLIC BLOOD PRESSURE: 44 MMHG | RESPIRATION RATE: 18 BRPM

## 2022-12-26 LAB
EKG ATRIAL RATE: 122 BPM
EKG DIAGNOSIS: NORMAL
EKG P AXIS: 34 DEGREES
EKG P-R INTERVAL: 152 MS
EKG Q-T INTERVAL: 306 MS
EKG QRS DURATION: 102 MS
EKG QTC CALCULATION (BAZETT): 436 MS
EKG R AXIS: -29 DEGREES
EKG T AXIS: 35 DEGREES
EKG VENTRICULAR RATE: 122 BPM

## 2022-12-26 PROCEDURE — 93010 ELECTROCARDIOGRAM REPORT: CPT | Performed by: INTERNAL MEDICINE

## 2022-12-26 NOTE — PLAN OF CARE
Problem: Discharge Planning  Goal: Discharge to home or other facility with appropriate resources  Outcome: Progressing     Problem: Potential for Alteration in Skin Integrity  Goal: Monitor skin for areas of alteration in skin integrity  Description: Patient [unfilled] will remain free from alterations of or worsening skin integrity as evidenced by no changes to skin during assessment each shift during the inpatient hospice stay. Outcome: Progressing     Problem: Alteration in Mobility  Goal: Remain as independent as possible and remain safe in environment  Description: Patient  will perform tasks or ADLs within their capabilities as often as they are able, as evidenced by remaining free of injury during the inpatient hospice stay. Outcome: Progressing     Problem: Pain  Goal: Control of acute pain  Description: Patient  will exhibit a decrease in pain as evidenced by a pain rating less than 7 on the 0-10 scale within 1 hour of receiving pain medication during the inpatient hospice stay. Outcome: Progressing     Problem: Pressure Injury - Risk of  Goal: Prevention of pressure injury  Description: Patient  will remain free from acquired or worsening pressure injuries as evidenced by zero acquired pressure injuries or no changes to current pressure injury during skin assessment each shift during the inpatient hospice stay. Patient  and or family/caregiver will verbalize recall of interventions and preventions of alteration in skin integrity during the admission process and ongoing as needed during the inpatient hospice stay.     Outcome: Progressing     Problem: Pain  Goal: Verbalizes/displays adequate comfort level or baseline comfort level  Outcome: Progressing

## 2022-12-26 NOTE — FLOWSHEET NOTE
Asked granddaughter if she notified her mother of his decline and she stated she would and would have her mother notify the other siblings

## 2022-12-26 NOTE — FLOWSHEET NOTE
[facility] called and informed of death. Patient is not able to be a donor.  Referral number is [id number]

## 2022-12-26 NOTE — FLOWSHEET NOTE
Patient heartbeat stopped and no breathing noted. Confirmed no heartbeat with a stethascope no obtainable pressure.  Patient

## 2022-12-27 LAB
CULTURE: ABNORMAL
Lab: ABNORMAL
Lab: ABNORMAL
SPECIMEN: ABNORMAL
SPECIMEN: ABNORMAL

## 2022-12-29 NOTE — PROCEDURES
Insert Arterial Line    Date/Time: 12/29/2022 10:30 AM  Performed by: Manav Rios MD  Authorized by: Manav Rios MD   Consent: The procedure was performed in an emergent situation. Patient identity confirmed: arm band and hospital-assigned identification number  Time out: Immediately prior to procedure a \"time out\" was called to verify the correct patient, procedure, equipment, support staff and site/side marked as required.   Indications: hemodynamic monitoring  Location: left radial  Needle gauge: 20  Seldinger technique: Seldinger technique used  Number of attempts: 1  Post-procedure: line sutured and dressing applied  Patient tolerance: patient tolerated the procedure well with no immediate complications        Manav Rios MD  12/29/2022

## 2023-01-02 NOTE — PROGRESS NOTES
Physician Progress Note      Raquel Grimes  CSN #:                  [id number]  :                       1945  ADMIT DATE:       2022 4:13 PM  [address] DATE:        2022 3:00 AM  RESPONDING  PROVIDER #:        Tyree Walsh MD          QUERY TEXT:    Pt admitted with Severe sepsis. Pt noted to have chronic indwelling turner   catheter and c-diff. If possible, please document in the progress notes and   discharge summary if you are evaluating and / or treating any of the   following: The medical record reflects the following:  Risk Factors: c-diff infection, chronic turner catheter  Clinical Indicators: WBC's 40.2/56.2, lactate 5.8, Urine cx >50,000 mixed   pathogens probable contaminant and UA negative nitrites and large   leukoesterase, C-diff positive ecoli shiga. Dr Hema Turner documented \"His wbc is   40k. This favors C diff colitis. \"  Treatment: cefepime, flagyl, vanco, labs, ICU, vasopressors    Thank you,  Dana Forman RN CDS Supervisor  [phone]  Options provided:  -- Sepsis related to chronic indwelling turner catheter  -- Sepsis related to c-diff infection  -- Sepsis related to chronic indwelling turner catheter and C-diff infection  -- Other - I will add my own diagnosis  -- Disagree - Not applicable / Not valid  -- Disagree - Clinically unable to determine / Unknown  -- Refer to Clinical Documentation Reviewer    PROVIDER RESPONSE TEXT:    This patient has sepsis related to c-diff infection.     Query created by: Sneha Mayers on 2022 8:07 AM      Electronically signed by:  Tyree Walsh MD 2023 7:30 AM